# Patient Record
Sex: MALE | Race: WHITE | NOT HISPANIC OR LATINO | Employment: OTHER | URBAN - METROPOLITAN AREA
[De-identification: names, ages, dates, MRNs, and addresses within clinical notes are randomized per-mention and may not be internally consistent; named-entity substitution may affect disease eponyms.]

---

## 2022-07-12 PROBLEM — Z79.4 TYPE 2 DIABETES MELLITUS WITH DIABETIC POLYNEUROPATHY, WITH LONG-TERM CURRENT USE OF INSULIN (HCC): Status: ACTIVE | Noted: 2022-07-12

## 2022-07-12 PROBLEM — E11.42 TYPE 2 DIABETES MELLITUS WITH DIABETIC POLYNEUROPATHY, WITH LONG-TERM CURRENT USE OF INSULIN (HCC): Status: ACTIVE | Noted: 2022-07-12

## 2022-07-12 PROBLEM — K76.0 NAFLD (NONALCOHOLIC FATTY LIVER DISEASE): Status: ACTIVE | Noted: 2022-07-12

## 2022-07-12 PROBLEM — M54.16 LUMBAR BACK PAIN WITH RADICULOPATHY AFFECTING LEFT LOWER EXTREMITY: Status: ACTIVE | Noted: 2022-07-12

## 2022-07-12 PROBLEM — L20.9 ATOPIC DERMATITIS: Status: ACTIVE | Noted: 2022-07-12

## 2022-07-13 PROBLEM — Z91.51 HISTORY OF SUICIDE ATTEMPT: Status: ACTIVE | Noted: 2019-06-10

## 2022-07-13 PROBLEM — M54.59 PAIN OF LUMBAR FACET JOINT: Status: ACTIVE | Noted: 2019-03-12

## 2022-07-13 PROBLEM — N40.1 LOWER URINARY TRACT SYMPTOMS DUE TO BENIGN PROSTATIC HYPERPLASIA: Status: ACTIVE | Noted: 2019-10-06

## 2022-07-13 PROBLEM — H47.011 ISCHEMIC OPTIC NEUROPATHY, RIGHT EYE: Status: ACTIVE | Noted: 2019-03-08

## 2022-07-13 PROBLEM — Z86.69 PERSONAL HISTORY OF OTHER DISEASES OF THE NERVOUS SYSTEM AND SENSE ORGANS: Status: ACTIVE | Noted: 2021-03-12

## 2022-07-13 PROBLEM — M46.80: Status: ACTIVE | Noted: 2021-07-22

## 2022-07-13 PROBLEM — E11.9 TYPE 2 DIABETES MELLITUS WITHOUT RETINOPATHY (HCC): Status: ACTIVE | Noted: 2019-01-24

## 2022-07-13 PROBLEM — H26.9 INCIPIENT CATARACT OF BOTH EYES: Status: ACTIVE | Noted: 2019-03-08

## 2022-11-09 PROBLEM — M79.671 RIGHT FOOT PAIN: Status: ACTIVE | Noted: 2022-11-09

## 2023-04-05 ENCOUNTER — TELEPHONE (OUTPATIENT)
Dept: NEUROLOGY | Facility: MEDICAL CENTER | Age: 68
End: 2023-04-05

## 2023-04-05 NOTE — TELEPHONE ENCOUNTER
Phone Number Called: [phone]    Call outcome: Left detailed message for patient. Informed to call back with any additional questions.    Message: Medical Assistant Lorrie for Dr. Cadet asked me to reach out to patient to schedule new patient appointment for MS.  LVM for patient to call us and provided the number to get him scheduled with Dr. Cadet as new patient for MS. Advise in the message that our first available is in June going into July timeframe. Please scheduled patient as new patient with Dr. Cadet as new patient next available.     ESTEFANY Cruz

## 2023-07-17 DIAGNOSIS — G37.9 DEMYELINATING DISEASE (HCC): Primary | ICD-10-CM

## 2023-07-18 ENCOUNTER — OFFICE VISIT (OUTPATIENT)
Dept: NEUROLOGY | Facility: MEDICAL CENTER | Age: 68
End: 2023-07-18
Attending: PSYCHIATRY & NEUROLOGY
Payer: MEDICARE

## 2023-07-18 ENCOUNTER — HOSPITAL ENCOUNTER (OUTPATIENT)
Dept: RADIOLOGY | Facility: MEDICAL CENTER | Age: 68
End: 2023-07-18

## 2023-07-18 VITALS
SYSTOLIC BLOOD PRESSURE: 114 MMHG | HEART RATE: 89 BPM | OXYGEN SATURATION: 99 % | DIASTOLIC BLOOD PRESSURE: 76 MMHG | BODY MASS INDEX: 25.3 KG/M2 | WEIGHT: 157.41 LBS | TEMPERATURE: 98.3 F | HEIGHT: 66 IN

## 2023-07-18 DIAGNOSIS — R41.9 COGNITIVE COMPLAINTS: ICD-10-CM

## 2023-07-18 DIAGNOSIS — G47.33 OSA (OBSTRUCTIVE SLEEP APNEA): Primary | ICD-10-CM

## 2023-07-18 DIAGNOSIS — G37.9 DEMYELINATING DISEASE (HCC): ICD-10-CM

## 2023-07-18 PROCEDURE — 99212 OFFICE O/P EST SF 10 MIN: CPT | Performed by: PSYCHIATRY & NEUROLOGY

## 2023-07-18 PROCEDURE — 3078F DIAST BP <80 MM HG: CPT | Performed by: PSYCHIATRY & NEUROLOGY

## 2023-07-18 PROCEDURE — 3074F SYST BP LT 130 MM HG: CPT | Performed by: PSYCHIATRY & NEUROLOGY

## 2023-07-18 PROCEDURE — 99204 OFFICE O/P NEW MOD 45 MIN: CPT | Performed by: PSYCHIATRY & NEUROLOGY

## 2023-07-18 RX ORDER — DULOXETINE 40 MG/1
40 CAPSULE, DELAYED RELEASE ORAL DAILY
COMMUNITY
Start: 2023-07-10 | End: 2023-10-08

## 2023-07-18 RX ORDER — GLUCAGON INJECTION, SOLUTION 1 MG/.2ML
INJECTION, SOLUTION SUBCUTANEOUS
COMMUNITY
Start: 2023-05-04

## 2023-07-18 RX ORDER — BUPROPION HYDROCHLORIDE 200 MG/1
TABLET, EXTENDED RELEASE ORAL
COMMUNITY
Start: 2023-07-10 | End: 2023-10-12

## 2023-07-18 ASSESSMENT — MONTREAL COGNITIVE ASSESSMENT (MOCA)
WHAT IS THE VERSION OF MOCA ADMINISTERED: 7.1
2. COPY DRAWING: 1/1
CATEGORY CUE (IF APPLICABLE): 1
7. [VIGILENCE] TAP WHEN HEARING DESIGNATED LETTER: 1/1
8. SERIAL SUBTRACTION OF 7S: 2 OR 3/5
3. DRAW A CLOCK: CONTOUR, NUMBERS, HANDS: 3/3
1. ALTERNATING TRAIL MAKING: 1/1
10. [FLUENCY] NAME WORDS STARTING WITH DESIGNATED LETTER: 1/1
6. READ LIST OF DIGITS [FORWARD/BACKWARD]: 2/2
WHAT IS THE TOTAL SCORE (OUT OF 30): 25
CATEGORY CUE (IF APPLICABLE): 3
11. FOR EACH PAIR OF WORDS, WHAT CATEGORY DO THEY BELONG TO (OUT OF 2): 2/2
ADD 1 POINT IF LESS THAN OR EQUAL TO 12 YR EDUCATION LEVEL: 0
5. MEMORY TRIALS: FIRST TRIAL;SECOND TRIAL
4. NAME EACH OF THE THREE ANIMALS SHOWN: 3/3
ORIENTATION SUBSCORE: 6/6
9. REPEAT EACH SENTENCE: 2/2
DELAYED RECALL SUBSCORE: 1/5

## 2023-07-18 ASSESSMENT — FIBROSIS 4 INDEX: FIB4 SCORE: 1.2

## 2023-07-18 NOTE — PROGRESS NOTES
"[facility] NEUROLOGY  MULTIPLE SCLEROSIS & NEUROIMMUNOLOGY  NEW PATIENT VISIT    Referral source: Michael Grier MD    DISEASE SUMMARY:  Principal neurologic diagnosis: abnormal MRI, possible optic neuritis  Diagnosis of MS: n/a  Disease History:  - 2017: ION OD  - 2021: onset of cognitive symptoms (language, memory)  - 4/2023: worsened visual acuity OS shortly after cataract surgery, treated with IVMP x5 days without improvement  Disease course at onset: possible optic neuritis  Current disease course: possible optic neuritis  Previous disease therapies:  - none  Current disease therapies:  - none  Symptomatic therapies:  -   CSF (4/5/2023):  - OCBs: negative  - protein: 56 (elevated)  Other Testing:  - anti-MOG Ab (4/4/0223, CBA): <1:10  MRI orbits:  - 4/3/2023: \"questionable restricted diffusion and subtle contrast enhancement within the posterior wall of the ocular globe in the expected location of the optic disc... nonspecific but raises a possibility of anterior ischemic optic neuropathy...\"  MRI head:  - 4/4/2023: \"altered white matter signal affecting the callosal septal interface and gonzalez-ventricular white matter particularly in the parietal white matter...\"  MRI cervical spine:  - 2/7/2023: \"no hyperintense T2 signal alteration and or contrast enhancement demonstrated within the substance of the spinal cord to suggest a demyelinating process.\"  MRI thoracic spine:  - 4/4/2023: \"normal... no evidence of acute or chronic demyelinating plaques...\"  Immunizations:  - influenza?:   - Pneumonia?:  - SARS-CoV-2?:   Cancer Screens:  - mammogram:   - PAP?:   - skin check:     CC: \"demyelinating disease...\"    HISTORY OF ILLNESS:  Abundio Logan is a 68 y.o. man with a history most notable for HTN, HLD, T2DM, ischemic optic neuropathy (ION, right), and MDD, GUSTAVO.  Today, he was accompanied by his wife, and together they provided the following history:    2017:  Fabian developed ION in the right eye.  Visual symptoms arose over " "the course of ~1 hour.  He perceived a central scotoma.  A short time later the majority of his visual field was involved.  The following morning he visited his ophthalmologist and was diagnosed with ION.    The visual symptoms in the right eye persisted.    2021:  Fabian developed \"significant\" cognitive symptoms.  Sometimes he has difficulty holding a conversation.  He has such profound word-finding difficulty that he sometimes avoids interacting with other people.  His memory is quite poor.  He forgets what he has had for dinner.    2022:  Fabian and his wife relocated from [place].    4/2023:  Fabian was diagnosed with cataract in the left eye.  He underwent the procedure.  Immediately afterward the acuity in the left eye was \"20/20.\"  Two days later the vision in the left eye became much worse.  He returned to his ophthalmologist.  Later, he was referred to the hospital.  He was told that the optic nerve was \"swollen\" and showing signs of deterioration.  He was told he had \"optic neuritis.\"    Fabian consulted with Dr. Grier.  Workup included MRI and LP.  He wasn't given an explanation for his condition.  He was treated with IVMP x5  days.  This was ineffective.    Since that time the vision in the left eye improved but hasn't completley returned to normal.  He has difficulty driving in hazy or nighttime conditions.  Recently, he has noticed desaturation of colors, though he hasn't localized this to a specific eye.    MEDICAL AND SURGICAL HISTORY:  Past Medical History:   Diagnosis Date    Anxiety     Arthritis     Depression     Diabetes (HCC)     Frequent urination at night     GERD (gastroesophageal reflux disease)     History of chickenpox     History of stroke     Hypertension     Sleep apnea     Suicidal ideations      Past Surgical History:   Procedure Laterality Date    CHOLECYSTECTOMY      LIVER BIOPSY      VASECTOMY       MEDICATIONS:  Current Outpatient Medications   Medication Sig    buPROPion (WELLBUTRIN " SR) 200 MG SR tablet     DULoxetine HCl 40 MG Cap DR Particles Take 40 mg by mouth every day.    GVOKE HYPOPEN 2-PACK 1 MG/0.2ML Solution Auto-injector inject 1 PEN NEEDLE subcutaneously if needed (FOR SEVERE HYPOGLYC...  (REFER TO PRESCRIPTION NOTES).    divalproex ER (DEPAKOTE ER) 500 MG TABLET SR 24 HR Take 500 mg by mouth every evening.    insulin glargine (LANTUS SOLOSTAR) 100 UNIT/ML Solution Pen-injector injection Inject 27 Units under the skin every evening.    betamethasone dipropionate 0.05 % Cream Apply thin layer topically to affected area twice daily.    bisoprolol-hydrochlorothiazide (ZIAC) 2.5-6.25 MG per tablet TAKE 1 TABLET DAILY    tamsulosin (FLOMAX) 0.4 MG capsule TAKE 1 CAPSULE DAILY    glucose blood (ONETOUCH VERIO) strip Check your blood sugar 2 to 3 times a day (Every morning before breakfast and every evening before dinner and sometimes before bed)    Insulin Pen Needle 32 G x 4 mm Use one insulin needle every day with insulin injection.    Multiple Vitamins-Minerals (MULTIVITAMIN ADULTS PO) Take  by mouth.    Ascorbic Acid (VITAMIN C) 1000 MG Tab Take  by mouth.    omeprazole (PRILOSEC) 20 MG delayed-release capsule Take 1 Capsule by mouth every day.    atorvastatin (LIPITOR) 40 MG Tab Take 1 Tablet by mouth every day.    OZEMPIC, 0.25 OR 0.5 MG/DOSE, 2 MG/3ML Solution Pen-injector Inject 0.25 mg under the skin every 7 days.     SOCIAL HISTORY:  Social History     Tobacco Use    Smoking status: Never    Smokeless tobacco: Never   Substance Use Topics    Alcohol use: Yes     Social History     Social History Narrative    Not on file     FAMILY HISTORY:  Family History   Problem Relation Age of Onset    Cancer Mother         breast    Cancer Father         esophageal and stomach     REVIEW OF SYSTEMS:  A ROS was completed.  Pertinent positives and negatives were included in the HPI, above.  All other systems were reviewed and are negative.    PHYSICAL EXAM:  General/Medical:  - NAD  - hair,  skin, nails, and joints were normal    Neuro:  MENTAL STATUS: awake and alert; no deficits of speech or language; oriented to person, place, and time; affect was appropriate to situation; pleasant, cooperative    CRANIAL NERVES:    II: acuity: J16/J1-1, fields: right eye with inferio-medial deficit, left eye with inferio-medial deficit, pupils: 3/3 to 2.5/2.5 without a relative afferent pupillary defect, discs: sharp, no red desaturation noted    III/IV/VI: versions: intact without nystagmus    V: facial sensation: symmetric to light touch    VII: facial expression: symmetric    VIII: hearing: intact to finger rub    IX/X: palate: elevates symmetrically    XI: shoulder shrug: symmetric    XII: tongue: midline    MOTOR:  - bulk: normal throughout  - tone: normal in the upper extremities  Upper Extremity Strength  (R/L)    5/5   Elbow flexion 5/5   Elbow extension 5/5   Shoulder abduction 5/5     Lower Extremity Strength  (R/L)   Hip flexion 5/2-4 (pain)   Knee extension 5/5   Knee flexion 5/5   Ankle plantarflexion 5/5   Ankle dorsiflexion 5/5     - heel-walk: NT  - toe-walk: NT  - pronator drift: absent  - abnormal movements: none    SENSATION:  - light touch: grossly intact over the upper- and lower extremities  - vibration (R/L, seconds): 3/6 at the great toes  - pinprick:NT  - proprioception: NT  - Romberg: absent    COORDINATION:  - finger to nose: normal, no ataxia on exam  - finger tapping: rapid and accurate, bilaterally    REFLEXES:  Reflex Right Left   BR NT NT   Biceps NT NT   Triceps NT NT   Patellae NT NT   Achilles NT NT   Toes NT NT     GAIT:  - narrow base and normal  - heel-walk: NT  - toe-walk: NT  - tandem gait: intact    QUANTITATIVE SCORES:  Timed 25-foot walk (sec): NT.  Assistive device: none    [place] Cognitive Assessment (MOCA) Version Number: 7.1   VISUOSPACIAL / EXECUTIVE   Clock Drawing: 3/3  Spatial Drawin/1  Cube Drawin/1    NAMING  Naming: 3/3    MEMORY  Memory: First  "trial;Second trial    ATTENTION  Digits: 2/2  Letters: 1/1  Subtraction: 2 or 3/5    LANGUAGE  Repeat Phrases: 2/2  Fluency: 1/1    ABSTRACTION  Abstraction: 2/2    DELAYED RECALL  Recall words: 1/5  Category Cue (if applicable): 3  Multiple Choice Cue (if applicable):1    ORIENTATION  Orientation: 6/6    Add 1 point if less than or equal to 12 yr education level: 0   MOCA TOTAL SCORE:  25 /30  Biomechanical/Visual Limitations (if applicable):       REVIEW OF IMAGING STUDIES:  I have summarized pertinent data above.    REVIEW OF LABORATORY STUDIES:  4/9/2023:  - CBC w/ diff: within acceptable limits  - CMP: notabl for glucose: 213    8/18/2022:  - TSH: 1.98    ASSESSMENT:  Abundio Logan is a 68 y.o. man with a mild cognitive impairment, possible optic neuritis, as well as HTN, HLD, T2DM, ischemic optic neuropathy (ION, right), and MDD, GUSTAVO.  Fabian's clinical history is not entirely convincing for multiple sclerosis.  We have requested the actual images from [facility], and I will review these once they become available.  I have also ordered the CNS demyelinating disease panel which includes cell-based tests for the anti-AQP4 and anti-MOG Abs.  HE meets criteria for mild cognitive impairment based on his performance on the MOCA.  I have ordered a vitamin B12 level in order to complete the recommended screening for reversible causes of dementia.  His sleep is disordered, so I referred him to Sleep Medicine.      PLAN:  Possible Optic Neuritis:  - CNS demyelinating disease profile  - will review outside images after they become available    Mild Cognitive Impairment:  - vitamin b12 level    Follow-Up:  - Return in about 5 months (around 12/18/2023).    Signed: Mo Cadet M.D.    BILLING DOCUMENTATION:   I spent 55 minutes reviewing the medical record, interviewing and examining the patient, discussing my impression (see \"assessment\" above), and coordinating care.    "

## 2023-11-15 ENCOUNTER — OFFICE VISIT (OUTPATIENT)
Dept: NEUROLOGY | Facility: MEDICAL CENTER | Age: 68
End: 2023-11-15
Attending: PSYCHIATRY & NEUROLOGY
Payer: MEDICARE

## 2023-11-15 VITALS
WEIGHT: 156.97 LBS | HEIGHT: 66 IN | RESPIRATION RATE: 15 BRPM | BODY MASS INDEX: 25.23 KG/M2 | OXYGEN SATURATION: 100 % | DIASTOLIC BLOOD PRESSURE: 70 MMHG | SYSTOLIC BLOOD PRESSURE: 118 MMHG | HEART RATE: 79 BPM | TEMPERATURE: 97.6 F

## 2023-11-15 DIAGNOSIS — F03.A0 MILD DEMENTIA WITHOUT BEHAVIORAL DISTURBANCE, PSYCHOTIC DISTURBANCE, MOOD DISTURBANCE, OR ANXIETY, UNSPECIFIED DEMENTIA TYPE (HCC): ICD-10-CM

## 2023-11-15 DIAGNOSIS — G25.0 ESSENTIAL TREMOR: ICD-10-CM

## 2023-11-15 DIAGNOSIS — H47.012 ISCHEMIC OPTIC NEUROPATHY OF LEFT EYE: ICD-10-CM

## 2023-11-15 PROCEDURE — 99215 OFFICE O/P EST HI 40 MIN: CPT | Performed by: PSYCHIATRY & NEUROLOGY

## 2023-11-15 PROCEDURE — 99212 OFFICE O/P EST SF 10 MIN: CPT | Performed by: PSYCHIATRY & NEUROLOGY

## 2023-11-15 PROCEDURE — 3078F DIAST BP <80 MM HG: CPT | Performed by: PSYCHIATRY & NEUROLOGY

## 2023-11-15 PROCEDURE — 3074F SYST BP LT 130 MM HG: CPT | Performed by: PSYCHIATRY & NEUROLOGY

## 2023-11-15 PROCEDURE — G2212 PROLONG OUTPT/OFFICE VIS: HCPCS | Performed by: PSYCHIATRY & NEUROLOGY

## 2023-11-15 RX ORDER — DIVALPROEX SODIUM 500 MG/1
500 TABLET, EXTENDED RELEASE ORAL DAILY
COMMUNITY

## 2023-11-15 ASSESSMENT — MONTREAL COGNITIVE ASSESSMENT (MOCA)
4. NAME EACH OF THE THREE ANIMALS SHOWN: 3/3
3. DRAW A CLOCK: CONTOUR, NUMBERS, HANDS: 3/3
CATEGORY CUE (IF APPLICABLE): 2
ORIENTATION SUBSCORE: 3/6
WHAT IS THE VERSION OF MOCA ADMINISTERED: 7.1
8. SERIAL SUBTRACTION OF 7S: 4 OR 5/5
7. [VIGILENCE] TAP WHEN HEARING DESIGNATED LETTER: 1/1
11. FOR EACH PAIR OF WORDS, WHAT CATEGORY DO THEY BELONG TO (OUT OF 2): 2/2
DELAYED RECALL SUBSCORE: 2/5
WHAT IS THE TOTAL SCORE (OUT OF 30): 24
2. COPY DRAWING: 1/1
10. [FLUENCY] NAME WORDS STARTING WITH DESIGNATED LETTER: 1/1
ADD 1 POINT IF LESS THAN OR EQUAL TO 12 YR EDUCATION LEVEL: 0
9. REPEAT EACH SENTENCE: 2/2
5. MEMORY TRIALS: SECOND TRIAL;FIRST TRIAL
1. ALTERNATING TRAIL MAKING: 1/1
6. READ LIST OF DIGITS [FORWARD/BACKWARD]: 2/2

## 2023-11-15 ASSESSMENT — FIBROSIS 4 INDEX: FIB4 SCORE: 2.26

## 2023-11-15 NOTE — PROGRESS NOTES
"[facility] NEUROLOGY  MULTIPLE SCLEROSIS & NEUROIMMUNOLOGY  FOLLOW-UP VISIT    DISEASE SUMMARY:  Principal neurologic diagnosis: abnormal MRI, possible optic neuritis  Diagnosis of MS: n/a  Disease History:  - 2017: ION OD  - 2021: onset of cognitive symptoms (language, memory)  - 4/2023: worsened visual acuity OS shortly after cataract surgery, treated with IVMP x5 days without improvement  Disease course at onset: possible optic neuritis  Current disease course: possible optic neuritis  Previous disease therapies:  - none  Current disease therapies:  - none  Symptomatic therapies:  -   CSF (4/5/2023):  - OCBs: negative  - protein: 56 (elevated)  Other Testing:  - anti-MOG Ab (4/4/0223, CBA): <1:10  - CNS demyelinating disease reflex panel (7/18/2023): negative (CBA)  MRI orbits:  - 4/3/2023: \"questionable restricted diffusion and subtle contrast enhancement within the posterior wall of the ocular globe in the expected location of the optic disc... nonspecific but raises a possibility of anterior ischemic optic neuropathy...\"  MRI head:  - 4/4/2023: \"altered white matter signal affecting the callosal septal interface and gonzalez-ventricular white matter particularly in the parietal white matter...\"  MRI cervical spine:  - 2/7/2023: \"no hyperintense T2 signal alteration and or contrast enhancement demonstrated within the substance of the spinal cord to suggest a demyelinating process.\"  MRI thoracic spine:  - 4/4/2023: \"normal... no evidence of acute or chronic demyelinating plaques...\"  Immunizations:  - influenza?:   - Pneumonia?:  - SARS-CoV-2?:   Cancer Screens:  - mammogram:   - PAP?:   - skin check:     CC: possible optic neuritis, mild cognitive impairment    INTERVAL HISTORY:  Abundio Logan is a 68 y.o. man with a mild cognitive impairment, possible optic neuritis, as well as HTN, HLD, T2DM, ischemic optic neuropathy (ION, right), and MDD, GUSTAVO.  I last saw him in the [facility] on 7/18/2023.  At that time I " recommended CNS demyelinating disease profile and vitamin B12 level.  Today, he was accompanied by his wife, and he provided the following interval history:    Fabian has not noticed any new neurologic symptoms since the last visit.    He continues to live at home with his wife.  Fabian drives without getting lost.  He manages money without making mistakes.  He does have difficulty filling his pillbox every week, and his wife provides assistance with this.  His sleep remains poor.  He is scheduled to consult with sleep medicine in a few months.    MEDICATIONS:  Current Outpatient Medications   Medication Sig    divalproex ER (DEPAKOTE ER) 500 MG TABLET SR 24 HR Take 500 mg by mouth every day.    atorvastatin (LIPITOR) 40 MG Tab Take 1 Tablet by mouth every day.    tamsulosin (FLOMAX) 0.4 MG capsule Take 1 Capsule by mouth every day.    divalproex (DEPAKOTE) 250 MG Tablet Delayed Response Take 1 Tablet by mouth at bedtime.    DULoxetine HCl 40 MG Cap DR Particles     propranolol (INDERAL) 10 MG Tab Take 10 mg by mouth 3 times a day as needed.    ondansetron (ZOFRAN) 4 MG Tab tablet Take 4 mg by mouth.    buPROPion (WELLBUTRIN SR) 200 MG SR tablet Take 1 Tablet by mouth every morning.    omeprazole (PRILOSEC) 20 MG delayed-release capsule Take 1 Capsule by mouth every day.    GVOKE HYPOPEN 2-PACK 1 MG/0.2ML Solution Auto-injector inject 1 PEN NEEDLE subcutaneously if needed (FOR SEVERE HYPOGLYC...  (REFER TO PRESCRIPTION NOTES).    insulin glargine (LANTUS SOLOSTAR) 100 UNIT/ML Solution Pen-injector injection Inject 27 Units under the skin every evening.    betamethasone dipropionate 0.05 % Cream Apply thin layer topically to affected area twice daily.    bisoprolol-hydrochlorothiazide (ZIAC) 2.5-6.25 MG per tablet TAKE 1 TABLET DAILY    glucose blood (ONETOUCH VERIO) strip Check your blood sugar 2 to 3 times a day (Every morning before breakfast and every evening before dinner and sometimes before bed)    Insulin Pen  Needle 32 G x 4 mm Use one insulin needle every day with insulin injection.    Multiple Vitamins-Minerals (MULTIVITAMIN ADULTS PO) Take  by mouth.     MEDICAL, SOCIAL, AND FAMILY HISTORY:  There is no change in the patient's ROS or medical, social, or family histories since the previous visit on 7/18/2023.    REVIEW OF SYSTEMS:  A ROS was completed.  Pertinent positives and negatives were included in the HPI, above.  All other systems were reviewed and are negative.    PHYSICAL EXAM:  General/Medical:  - NAD    Neuro:  MENTAL STATUS: awake and alert; no deficits of speech or language; oriented to conversation; affect was appropriate to situation; pleasant, cooperative    CRANIAL NERVES:    II: acuity: NT, fields: NT, pupils: NT, discs: NT    III/IV/VI: versions: grossly intact    V: facial sensation: NT    VII: facial expression: symmetric    VIII: hearing: intact to voice    IX/X: palate: NT    XI: shoulder shrug: NT    XII: tongue: NT    MOTOR:  - bulk: NT  - tone: NT  Upper Extremity Strength (R/L)    NT   Elbow flexion NT   Elbow extension NT   Shoulder abduction NT     Lower Extremity Strength (R/L)   Hip flexion NT   Knee extension NT   Knee flexion NT   Ankle plantarflexion NT   Ankle dorsiflexion NT     - pronator drift: NT  - abnormal movements: action tremor which is worse with attempts at [employer] [place]    SENSATION:  - light touch: NT  - vibration (R/L, seconds): NT at the great toes  - pinprick: NT  - proprioception: NT  - Romberg: NT    COORDINATION:  - finger to nose: NT  - finger tapping: NT    REFLEXES:  Reflex Right Left   BR NT NT   Biceps NT NT   Triceps NT NT   Patellae NT NT   Achilles NT NT   Toes NT NT     GAIT:  - NT    REVIEW OF IMAGING STUDIES:  Outside images reviewed.    REVIEW OF LABORATORY STUDIES:  7/18/2023:  - vitamin B12: 699    ASSESSMENT:  Abundio Logan is a 68 y.o. man with a history of ION (left), mild cognitive impairment, essential tremor as well as HTN, HLD, T2DM,  ischemic optic neuropathy (ION, right), and MDD, GUSTAVO.  Since last visit we obtained outside images.  We reviewed these together during the visit.  The distribution of lesions is not consistent with multiple sclerosis.  Given his age, medical history, and absence of CNS-specific alcohol bands, I have a very low suspicion for multiple sclerosis.  I will attribute the left monocular symptoms to ischemic optic neuropathy.  Since last visit Fabian and his wife have noticed progressively worse cognitive symptoms.  Since he has difficulty managing medications on his own, he probably meets criteria for a dementia.  At the last visit I reviewed laboratory studies in search of a reversible cause of cognitive impairment and ordered a B12 level.  All of his laboratories were normal.  I repeated a MoCA and his score was one-point lower than it was at the time of the last visit.  I discussed the implications of a possible diagnosis of dementia at length and reviewed a list of recommendations to promote good cognitive function.  I encouraged him to keep the sleep medicine appointment since disordered sleep will make his cognition worse.  We will follow-up in approximately 6 months to reassess his cognition.  Fabian has essential tremor, and this is being treated with propranolol.    PLAN:  History of ION:  - no additional workup at this time    Mild Cognitive Impairment vs Dementia:  1. Proper Diet: We recommend the Mediterranean diet   2. Proper Vascular Health: Making sure that your primary care provider (PCP) is screening for and treating all vascular risk factors (diabetes, high cholesterol, high blood pressure, and such)  3. Quit smoking, if you smoke   4. Exercise as tolerated with a goal of at least 30 minutes 5 days a week or a total of 150 minutes per week  5. Focus on what you enjoy  6. Try learning new hobbies or skills, even if you’re not great at them  7. Regular social interaction: Maintain an active social life as much as  "possible   8. Keep your brain active with cognitively stimulating activities such as brain games  9. Get 7-8 hours of sleep per night; keep appointment w/ Sleep Medicine  10. Maintain a predictable daily routine  [You may visit www.dementiafriendly[place].org AND www.healthybrains.org for more information]    Essential Tremor:  - continue propranolol    Follow-Up:  - Return in about 6 months (around 5/15/2024).    Signed: Mo Cadet M.D.    BILLING DOCUMENTATION:   I spent 55 minutes reviewing the medical record, interviewing and examining the patient, discussing my impression (see \"assessment\" above), and coordinating care.  "

## 2023-12-26 ASSESSMENT — ENCOUNTER SYMPTOMS: SLEEP DISTURBANCE: 0

## 2023-12-27 ENCOUNTER — OFFICE VISIT (OUTPATIENT)
Dept: SLEEP MEDICINE | Facility: MEDICAL CENTER | Age: 68
End: 2023-12-27
Attending: PSYCHIATRY & NEUROLOGY
Payer: MEDICARE

## 2023-12-27 VITALS
SYSTOLIC BLOOD PRESSURE: 116 MMHG | DIASTOLIC BLOOD PRESSURE: 80 MMHG | BODY MASS INDEX: 23.78 KG/M2 | HEART RATE: 70 BPM | RESPIRATION RATE: 16 BRPM | HEIGHT: 66 IN | WEIGHT: 148 LBS | OXYGEN SATURATION: 95 %

## 2023-12-27 DIAGNOSIS — G47.33 OSA (OBSTRUCTIVE SLEEP APNEA): ICD-10-CM

## 2023-12-27 DIAGNOSIS — F51.02 ADJUSTMENT INSOMNIA: ICD-10-CM

## 2023-12-27 DIAGNOSIS — Z79.4 TYPE 2 DIABETES MELLITUS WITH DIABETIC POLYNEUROPATHY, WITH LONG-TERM CURRENT USE OF INSULIN (HCC): ICD-10-CM

## 2023-12-27 DIAGNOSIS — E11.42 TYPE 2 DIABETES MELLITUS WITH DIABETIC POLYNEUROPATHY, WITH LONG-TERM CURRENT USE OF INSULIN (HCC): ICD-10-CM

## 2023-12-27 DIAGNOSIS — I10 ESSENTIAL HYPERTENSION: ICD-10-CM

## 2023-12-27 PROCEDURE — 99212 OFFICE O/P EST SF 10 MIN: CPT | Performed by: PREVENTIVE MEDICINE

## 2023-12-27 PROCEDURE — 3074F SYST BP LT 130 MM HG: CPT | Performed by: PREVENTIVE MEDICINE

## 2023-12-27 PROCEDURE — 99204 OFFICE O/P NEW MOD 45 MIN: CPT | Performed by: PREVENTIVE MEDICINE

## 2023-12-27 PROCEDURE — 3079F DIAST BP 80-89 MM HG: CPT | Performed by: PREVENTIVE MEDICINE

## 2023-12-27 RX ORDER — TEMAZEPAM 7.5 MG/1
7.5 CAPSULE ORAL
Qty: 2 CAPSULE | Refills: 0 | Status: SHIPPED | OUTPATIENT
Start: 2023-12-27 | End: 2024-01-31

## 2023-12-27 ASSESSMENT — FIBROSIS 4 INDEX: FIB4 SCORE: 2.26

## 2023-12-27 NOTE — PROGRESS NOTES
"CHIEF COMPLIANT: \"Establish care.\"  COLLATERAL:  WIFE COLT  HISTORY OF PRESENT ILLNESS:  Abundio Logan is a 68 y.o. male kindly referred by REBECA Vanegas and  is here for a sleep medicine consultation.     Sleep History:  Abundio Logan has been tested for sleep apnea.by the [facility] using a split night PSG on 5/15/2015.  Patient was advised to begin using PAP therapy but did not tolerate it for long.  Currently the patient reports snoring , apneas and fragmented sleep and wakes up with a dry mouth.  He also has a history of sxs of GERD at night.   He reports memory loss as well. The patient goes to bed at 9-11 pm and wakes up at 7 am. It usually takes the patient approximately 5-10 mins to fall asleep. The patient does not  feel refreshed and does nap during the day.  ( 1 -1.5   hours ) The patient has never  used tobacco.   . Pt does use cannabis ( Indica smokes or use a gummy  5-6 X /week).  This pt does drink 2 alcoholic beverages per month and has 1-2 caffeinated beverages daily.     The patient denies any symptoms of narcolepsy such as sleep paralysis or cataplexy, or any symptoms that suggest parasomnias such as sleep walking or acting out of dreams.    Abundio Logan is a  retired  [employer] .     Endorses family members who use PAP therapy/snore: parents snored    EPWORTH SCORE:     12 /24, this is  consistent with EDS      TYPE: split night PSG  DATE: 5/15/2015  Diagnostic:AHI:  54.5  Diagnostic  Oxygen Lucien: 82%   TREATMENT AHI:  27.3  TREATMENT Oxygen Lucien: 79%   TITRATION:  CPAP and BILEVEL  PLMS index: 20.2      Significant comorbidities and modifying factors: see below    PAST MEDICAL HISTORY:  Past Medical History:   Diagnosis Date    Abdominal pain     Anxiety     Apnea, sleep     Arthritis     Back pain     Back pain     Chickenpox     Constipation     Daytime sleepiness     Depression     Diabetes (HCC)     Diarrhea     Difficulty " swallowing     Dizziness     Earache     Eye drainage     Fatigue     Frequent urination     Frequent urination at night     GERD (gastroesophageal reflux disease)     Hearing difficulty     History of chickenpox     History of stroke     Hyperlipidemia     Hypertension     Influenza     Insomnia     Kidney stone     Mumps     Nasal drainage     Nausea     Painful joint     Ringing in ears     Sleep apnea     Snoring     Suicidal ideations     Sweat, sweating, excessive     Tonsillitis     Vision loss     Weakness     Wears glasses       PROBLEM LIST:  Patient Active Problem List    Diagnosis Date Noted    Ischemic optic neuropathy of left eye 11/15/2023    Mild dementia without behavioral disturbance, psychotic disturbance, mood disturbance, or anxiety ([facility]) 11/15/2023    Essential tremor 11/15/2023    Right foot pain 11/09/2022    NAFLD (nonalcoholic fatty liver disease) 07/12/2022    Type 2 diabetes mellitus with diabetic polyneuropathy, with long-term current use of insulin ([facility]) 07/12/2022    Atopic dermatitis 07/12/2022    Lumbar back pain with radiculopathy affecting left lower extremity 07/12/2022    Other specified inflammatory spondylopathies, site unspecified ([facility]) 07/22/2021    Personal history of other diseases of the nervous system and sense organs 03/12/2021    Lower urinary tract symptoms due to benign prostatic hyperplasia 10/06/2019    History of suicide attempt 06/10/2019    Pain of lumbar facet joint 03/12/2019    Incipient cataract of both eyes 03/08/2019    Ischemic optic neuropathy, right eye 03/08/2019    Type 2 diabetes mellitus without retinopathy ([facility]) 01/24/2019    Cerebrovascular accident (CVA) ([facility]) 08/05/2016    Hypersomnia due to medical condition 08/05/2016    Obstructive sleep apnea syndrome 08/05/2016    Dyslipidemia 05/31/2013    Memory loss 10/28/2012    High aspartate transaminase measurement 01/30/2010    Disorder of intervertebral disc 01/25/2010    GERD (gastroesophageal  reflux disease) 08/19/2009    Generalized anxiety disorder 08/04/2009    HTN (hypertension) 08/04/2009    Major depressive disorder, recurrent episode, mild (HCC) 08/04/2009    Osteoarthrosis 08/04/2009    Rosacea 08/04/2009     PAST SOCIAL HISTORY:  Past Surgical History:   Procedure Laterality Date    CHOLECYSTECTOMY      LIVER BIOPSY      ME REMV 2ND CATARACT,CORN-SCLER SECTN      VASECTOMY       PAST FAMILY HISTORY:  Family History   Problem Relation Age of Onset    Cancer Mother         breast    Breast Cancer Mother     Cancer Father         esophageal and stomach    Stroke Father     Stroke Paternal Grandfather      SOCIAL HISTORY:  Social History     Socioeconomic History    Marital status:      Spouse name: Not on file    Number of children: Not on file    Years of education: Not on file    Highest education level: Associate degree: occupational, technical, or vocational program   Occupational History    Not on file   Tobacco Use    Smoking status: Some Days    Smokeless tobacco: Never   Vaping Use    Vaping Use: Never used   Substance and Sexual Activity    Alcohol use: Yes     Alcohol/week: 0.6 oz     Types: 1 Standard drinks or equivalent per week     Comment: Typically when eating meals out    Drug use: Yes     Frequency: 4.0 times per week     Types: Marijuana     Comment: In evening to help with sleep    Sexual activity: Not on file   Other Topics Concern    Not on file   Social History Narrative    Not on file     Social Determinants of Health     Financial Resource Strain: Low Risk  (11/7/2022)    Overall Financial Resource Strain (CARDIA)     Difficulty of Paying Living Expenses: Not hard at all   Food Insecurity: No Food Insecurity (11/7/2022)    Hunger Vital Sign     Worried About Running Out of Food in the Last Year: Never true     Ran Out of Food in the Last Year: Never true   Transportation Needs: No Transportation Needs (11/7/2022)    PRAPARE - Transportation     Lack of Transportation  (Medical): No     Lack of Transportation (Non-Medical): No   Physical Activity: Sufficiently Active (11/7/2022)    Exercise Vital Sign     Days of Exercise per Week: 3 days     Minutes of Exercise per Session: 60 min   Stress: Unknown (11/7/2022)    [employer] - Occupational Stress Questionnaire     Feeling of Stress : Patient refused   Social Connections: Unknown (11/7/2022)    Social Connection and Isolation Panel [NHANES]     Frequency of Communication with Friends and Family: Twice a week     Frequency of Social Gatherings with Friends and Family: More than three times a week     Attends Church Services: Patient refused     Active Member of Clubs or Organizations: No     Attends Club or Organization Meetings: Never     Marital Status:    Intimate Partner Violence: Not on file   Housing Stability: Low Risk  (11/7/2022)    Housing Stability Vital Sign     Unable to Pay for Housing in the Last Year: No     Number of Places Lived in the Last Year: 2     Unstable Housing in the Last Year: No     ALLERGIES: Azithromycin, Tetracycline, Erythromycin, Lisinopril, and Metformin  MEDICATIONS:  Current Outpatient Medications   Medication Sig Dispense Refill    temazepam (RESTORIL) 7.5 MG capsule Take 1 Capsule by mouth at bedtime as needed.  May repeat 1 time. for Sleep (may take 1 capssule  at bedtime and repeat X 1 for Sleep study) for up to 2 doses. Two caps make up  a one day dose.  Indications: Trouble Sleeping 2 Capsule 0    divalproex ER (DEPAKOTE ER) 500 MG TABLET SR 24 HR Take 500 mg by mouth every day.      atorvastatin (LIPITOR) 40 MG Tab Take 1 Tablet by mouth every day. 90 Tablet 1    tamsulosin (FLOMAX) 0.4 MG capsule Take 1 Capsule by mouth every day. 60 Capsule 0    DULoxetine HCl 40 MG Cap DR Particles       propranolol (INDERAL) 10 MG Tab Take 10 mg by mouth 3 times a day as needed.      ondansetron (ZOFRAN) 4 MG Tab tablet Take 4 mg by mouth.      buPROPion  "(WELLBUTRIN SR) 200 MG SR tablet Take 1 Tablet by mouth every morning.      omeprazole (PRILOSEC) 20 MG delayed-release capsule Take 1 Capsule by mouth every day. 60 Capsule 1    GVOKE HYPOPEN 2-PACK 1 MG/0.2ML Solution Auto-injector inject 1 PEN NEEDLE subcutaneously if needed (FOR SEVERE HYPOGLYC...  (REFER TO PRESCRIPTION NOTES).      insulin glargine (LANTUS SOLOSTAR) 100 UNIT/ML Solution Pen-injector injection Inject 27 Units under the skin every evening.      betamethasone dipropionate 0.05 % Cream Apply thin layer topically to affected area twice daily. 45 g 1    bisoprolol-hydrochlorothiazide (ZIAC) 2.5-6.25 MG per tablet TAKE 1 TABLET DAILY 90 Tablet 1    glucose blood (ONETOUCH VERIO) strip Check your blood sugar 2 to 3 times a day (Every morning before breakfast and every evening before dinner and sometimes before bed) 200 Strip 1    Insulin Pen Needle 32 G x 4 mm Use one insulin needle every day with insulin injection. 200 Each 1    Multiple Vitamins-Minerals (MULTIVITAMIN ADULTS PO) Take  by mouth.      divalproex (DEPAKOTE) 250 MG Tablet Delayed Response Take 1 Tablet by mouth at bedtime. (Patient not taking: Reported on 12/27/2023)       No current facility-administered medications for this visit.    \"CURRENT RX\"    REVIEW OF SYSTEMS:  Constitutional: Denies weight loss, endorses chronic daytime fatigue --also see HPI    PHYSICAL EXAM/VITALS:  /80 (BP Location: Left arm, Patient Position: Sitting, BP Cuff Size: Adult)   Pulse 70   Resp 16   Ht 1.676 m (5' 6\")   Wt 67.1 kg (148 lb)   SpO2 95%   BMI 23.89 kg/m²   Neck circumference (inches): 16  Appearance: Well-nourished, well-developed,  looks stated age, no acute distress  Eyes:   EOMI  ENMT: Mallampati:4    Neck: Supple, trachea midline  Respiratory effort:  No intercostal retractions or use of accessory muscles  Lung auscultation:  No wheezes rhonchi rubs or rales  Cardiac: No murmurs, rubs, or gallops; regular rhythm, normal rate; no " edema  Musculoskeletal:  Grossly normal; gait and station normal  Neurologic:  oriented to person, time, place, and purpose; judgement intact  Psychiatric:  No depression, anxiety, agitation    MEDICAL DECISION MAKING:  The medical record was reviewed as it pertains to this referral. This includes records from primary care,consultants notes, referral request, hospital records, labs and imaging. Any available diagnostic and titration nocturnal polysomnograms, home sleep apnea tests, continuous nocturnal oximetry results, multiple sleep latency tests, and recent compliance reports were reviewed with the patient.    ASSESSMENT/PLAN:  Abundio Logan is a 68 y.o. male  who has been diagnosed obstructive sleep apnea as of 2015. On that study he was also observed to have Cheyne Fleming respiratory pattern which is consistent with central apneas.  Patient denies any awareness of leg movement during sleep.  Patient understands that he will need to be retested with a split night study in order to confirm his diagnoses at this time.          DIAGNOSES :    1. CHANTEL (obstructive sleep apnea)  - Polysomnography, 4 or More; Future    2. Essential hypertension  - Polysomnography, 4 or More; Future    3. Type 2 diabetes mellitus with diabetic polyneuropathy, with long-term current use of insulin (HCC)  - Polysomnography, 4 or More; Future    4. Adjustment insomnia  - temazepam (RESTORIL) 7.5 MG capsule; Take 1 Capsule by mouth at bedtime as needed.  May repeat 1 time. for Sleep (may take 1 capssule  at bedtime and repeat X 1 for Sleep study) for up to 2 doses. Two caps make up  a one day dose.  Indications: Trouble Sleeping  Dispense: 2 Capsule; Refill: 0    The patient has signs and symptoms consistent with obstructive sleep apnea hypopnea syndrome. Will schedule a nocturnal polysomnogram or a home sleep apnea test (please see plan).  The risks of untreated sleep apnea were discussed with the patient at length. Patients with CHANTEL are at  increased risk of cardiovascular disease including coronary artery disease, systemic arterial hypertension, pulmonary arterial hypertension, cardiac arrhythmias, and stroke. CHANTEL patients have an increased risk of motor vehicle accidents, type 2 diabetes, chronic kidney disease, and non-alcoholic liver disease. The patient was advised to avoid driving a motor vehicle when drowsy.  Have advised the patient to follow up with the appropriate healthcare practitioners for all other medical problems and issues.    RETURN TO CLINIC: Return for 3 weeks after SS - discuss results w/ any provider.    My total time spent caring for the patient on the day of the encounter was 40 minutes. This includes time spent on a thorough chart review including other physician notes, all sleep studies, as well as critical labs and pulmonary and cardiac studies.  Additionally, it includes a thorough discussion of good sleep hygiene and stimulus control, as well as  the need for consistency in terms of sleep preparation and practice.    Please note that this dictation was created using voice recognition software.  I have made every reasonable attempt to correct obvious errors, I expect that there are errors of grammar and possibly content that I did not discover before finalizing this note.                    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 If so, please list the year and result:: ?  Have you had Allergy skin testing? If so, please list the year and result:: Yes / approx 2015 fragrances  Please briefly describe your sleep problem and how old you were when it began.: Interrupted sleep patterns always  How does this affect your daily life and activities? Please also rate how serious of a problem this is (1 = Not at all, 10 = Very Serious).: Fatigue 7  Have you had any previous evaluations, examinations, or treatment for this sleep problem or any other problems with your sleep? If so, please describe the evaluation, treatment, and results.: Yes  received Bi-Pap  Have you used any medications (prescribed or otherwise) to help your sleep problem? If yes, include name, amount, frequency, and the prescribing physician.: Yes [place]  If employed, what time do you usually start and end work?: N/a  Do you ever change work shifts? If yes, describe how often (never, infrequently, regularly).: Yes and no  What time do you usually go to bed and wake up on: Weekdays? Weekends?: 9 - 9:30.  Up around 7:30  How many minutes does it usually take to fall asleep at night after turning off the lights?: 30 to 1.5 hr  What do you ordinarily do just prior to turning out the lights and attempting to go to sleep (e.g., reading, TV, baths, etc.)?: Smoke weed or gummy  On average, how many times do you wake up during the night?: 3 to 10  On average, how many times do you wake up to use the bathroom?: 2  Do you often wake up too early in the morning and are unable to return to sleep?: No  On average, how many hours of sleep do you get per night?: 6 to 8  How do you usually awaken?  Alarm, spontaneously, or other?: Spontaneous and occ  alarm  Is it difficult for you to awaken and get out of bed after sleeping? (Not at all, Sometimes, Very): Most always  Do you nap or return to bed after arising?: Yes  Are you bothered by sleepiness during the day?: Yes  Do you feel that you get too much sleep at night?: No  Do you feel that you get too little sleep at night?: To little sound sleep  Do you usually feel tired during the day? If so, what do you attribute this to?: Possibly current R/x’s but always had this  Do you find yourself falling asleep when you don't mean to? : Yes  If yes, how long does your sleep episode last?: Sometimes seconds or longer  Do you feel rested or refreshed after the sleep episode?: No  Have you ever suddenly fallen?: Do have some balance problems  Have you ever experienced sudden body weakness?: No  If yes, were you aware of the things around you?: No  Have you ever experienced weakness or paralysis upon going to sleep?: No  Have you ever experienced weakness or paralysis upon awakening from sleep?: No  If yes for either of the above two questions, please indicate how many times per week does this occur?: N/a  Have you ever experienced seeing things or hearing voices/noises: That weren't real? On going to sleep? During the night? On awakening from sleep? During the day?: Don’t think so  Do you have difficulty breathing at night? If yes, briefly describe.: No  How many times per week?: No  How did you become aware of this, at what age did this first occur, and how many years has this occurred?: N/a  Have you been told you snore while asleep? If so, does it disturb a bed partner (or someone in the same room), or someone in the next room?: Yes duration between breaths scares wife  Have you ever experienced doing something without being aware of the action? If yes, please describe.: No  How many times per week does this occur?: N/a  Have you ever experienced upon lying in bed before sleep or on awakening from sleep: Restlessness  "of legs, \"nervous legs\", \"creeping crawling\" sensation of legs, or twitching of legs?: Yes extremely rare  How many times per week does this occur, and how many minutes does the sensation last?: N/a  Does anything relieve the sensations (e.g., getting out of bed, medication, massage)?: No  At what age did this first occur, and how many years has this occurred?: ?  Have you ever been told that your arms or legs jerk or twitch while you are asleep? If yes, how many times per night does this occur?: Last sleep study- ??  At what age did this first occur, and how many years has this occurred?: ?  Does this seem to awaken you from your sleep?: No  Do you know, or have you ever been told that you do any of the following while sleeping: talk, walk, grit teeth, wet the bed, wake up screaming or seemingly afraid, have disturbing dreams, have unusual movements, wake up with headaches, (males) have erections? If yes to any of these, please indicate how many times per week, age started, last occurrence, treatment received.: Yes  Has anyone in your family been known to have any sleep problems? If yes, please list the type of problem (e.g., trouble getting to sleep, too sleepy, bed wetting, etc.), the relationship of this person to you, and the treatment received.: Unknown    "

## 2024-01-16 ENCOUNTER — SLEEP STUDY (OUTPATIENT)
Dept: SLEEP MEDICINE | Facility: MEDICAL CENTER | Age: 69
End: 2024-01-16
Attending: PREVENTIVE MEDICINE
Payer: MEDICARE

## 2024-01-16 DIAGNOSIS — E11.42 TYPE 2 DIABETES MELLITUS WITH DIABETIC POLYNEUROPATHY, WITH LONG-TERM CURRENT USE OF INSULIN (HCC): ICD-10-CM

## 2024-01-16 DIAGNOSIS — G47.33 OSA (OBSTRUCTIVE SLEEP APNEA): ICD-10-CM

## 2024-01-16 DIAGNOSIS — Z79.4 TYPE 2 DIABETES MELLITUS WITH DIABETIC POLYNEUROPATHY, WITH LONG-TERM CURRENT USE OF INSULIN (HCC): ICD-10-CM

## 2024-01-16 DIAGNOSIS — I10 ESSENTIAL HYPERTENSION: ICD-10-CM

## 2024-01-16 PROCEDURE — 95811 POLYSOM 6/>YRS CPAP 4/> PARM: CPT | Performed by: PREVENTIVE MEDICINE

## 2024-01-17 NOTE — PROCEDURES
Patient: LIGIA ORR  ID: [id number] Date: 1/16/2024 Exam No.:   MONTAGE: Standard  STUDY TYPE: Split Night  RECORDING TECHNIQUE:   After the scalp was prepared, gold plated electrodes were applied to the scalp according to the International 10-20 System. EEG (electroencephalogram) was continuously monitored from the O1-M2, O2-M1, C3-M2, C4-M1, F3-M2, and F4-M1. EOGs (electrooculograms) were monitored by electrodes placed at the left and right outer canthi. Chin EMG (electromyogram) was monitored by electrodes placed on the mentalis and sub-mentalis muscles. Nasal and oral airflow were monitored using a triple port thermocouple as well as oronasal pressure transducer. Respiratory effort was measured by inductive plethysmography technology employing abdominal and thoracic belts. Blood oxygen saturation and pulse were monitored by pulse oximetry. Heart rhythm was monitored by surface electrocardiogram. Leg EMG was studied using surface electrodes placed on left and right anterior tibialis. A microphone was used to monitor tracheal sounds and snoring. Body position was monitored and documented by technician observation.   SCORING CRITERIA:   A modification of the AASM manual for scoring of sleep and associated events was used. Obstructive apneas were scored by cessation of airflow for at least 10 seconds with continuing respiratory effort. Central apneas were scored by cessation of airflow for at least 10 seconds with no respiratory effort. Hypopneas were scored by a 30% or more reduction in airflow for at least 10 seconds accompanied by arterial oxygen desaturation of 3% or an arousal. For CMS (Medicare) patients, per AASM rule 1B, hypopneas are scored by 30% with mild reduction in airflow for at least 10 seconds accompanied by arterial saturation decreased at 4%.  DIAGNOSTIC  Study start time was 08:45:29 PM. Diagnostic recording time was 235 minutes with a total sleep time of 196 minutes resulting in a sleep  efficiency of 83.40%%. Sleep latency from the start of the study was 14 minutes and the latency from sleep to REM was 61 minutes. In total,65 arousals were scored for an arousal index of 19.9.  Respiratory:  There were a total of 84 apneas consisting of 53 obstructive apneas, 0 mixed apneas, and 31 central apneas. A total of 85 hypopneas were scored. The apnea index was 25.71 per hour and the hypopnea index was 26.02 per hour resulting in an overall AHI of 51.73. AHI during REM was 43.6 and AHI while supine was 51.73.  Oximetry:  There was a mean oxygen saturation of 92.0%. The minimum oxygen saturation during NREM sleep was 76.0% and in REM was 76.0%. Time spent during sleep with oxygen saturations <88% was 26.9 minutes.   Cardiac:  The highest heart rate seen while awake was 92 BPM while the highest heart rate during sleep was 81 BPM with an average sleeping heart rate of 63 BPM.  Limb Movements:  There were a total of 53 PLMs during sleep, which resulted in a PLM index of 16.2. There were 16 PLMs associated with arousals which resulted in a PLMS arousal index of 4.9.  TREATMENT:  Treatment recording time was 5h 13.0m (313 minutes) with a total sleep time of 3h 31.0m (211 minutes) resulting in a sleep efficiency of 67.4%. Sleep latency from the start of treatment was 00 minutes and REM latency from sleep onset was 0h 23.5m. The patient had 125 arousals in total for an arousal index of 35.5.  Respiratory:   There were 70 apneas in total consisting of 13 obstructive apneas, 57 central apneas, and 0 mixed apneas for an apnea index of 19.91. The patient had 16 hypopneas in total, which resulted in a hypopnea index of 4.55. The overall AHI was 24.45, with a REM AHI of 36.00, and a supine AHI of 35.37.   Oximetry:  The mean SaO2 during treatment was 94.0%. The minimum oxygen saturation in NREM was 82.0 % and in REM was 87.0%. Patient spent 5.8 minutes of TST with SaO2 <88%.  Cardiac:  The highest heart rate during sleep  was 81 BPM with an average sleeping heart rate of 62BPM.  Limb Movements:  There were a total of 103 PLMS during titration sleep time that resulted in an index of 29.3. There were 40 PLMS associated with arousals. This resulted in a PLM arousal index of 11.4.  Titration:   This was a fully attended sleep study. This test was technically adequate. CPAP was tried from 6 to 11.  Assessment:   DIAGNOSTIC: Severe Obstructive Sleep Apnea Hypopnea - AHI 51.7 Severe Nocturnal oxygen desaturation - gamaliel saturation 76% - saturations <88% below for 26.9 minutes of TST.  TREATMENT: Moderate Obstructive Sleep Apnea Hypopnea - AHI 24.5 NOTE: 66.3 of all events were classified as central apneas.   Mild Nocturnal oxygen desaturation - gamaliel saturation 82% - saturations <88% below for 5.8 minutes of TST.  Recommendation:   This patient has severe CHANTEL and with treatment he has significant treatment emergent centrals.  With 66% of all treatment events being classified as central apneas, this patient should be retitrated with either BIPAP ST or ASV.

## 2024-01-31 ENCOUNTER — OFFICE VISIT (OUTPATIENT)
Dept: SLEEP MEDICINE | Facility: MEDICAL CENTER | Age: 69
End: 2024-01-31
Attending: NURSE PRACTITIONER
Payer: MEDICARE

## 2024-01-31 VITALS
HEART RATE: 79 BPM | SYSTOLIC BLOOD PRESSURE: 118 MMHG | RESPIRATION RATE: 14 BRPM | OXYGEN SATURATION: 97 % | WEIGHT: 148 LBS | BODY MASS INDEX: 23.78 KG/M2 | DIASTOLIC BLOOD PRESSURE: 62 MMHG | HEIGHT: 66 IN

## 2024-01-31 DIAGNOSIS — G47.33 OSA (OBSTRUCTIVE SLEEP APNEA): ICD-10-CM

## 2024-01-31 PROCEDURE — 99214 OFFICE O/P EST MOD 30 MIN: CPT | Performed by: NURSE PRACTITIONER

## 2024-01-31 PROCEDURE — 3078F DIAST BP <80 MM HG: CPT | Performed by: NURSE PRACTITIONER

## 2024-01-31 PROCEDURE — 3074F SYST BP LT 130 MM HG: CPT | Performed by: NURSE PRACTITIONER

## 2024-01-31 PROCEDURE — 99213 OFFICE O/P EST LOW 20 MIN: CPT | Performed by: NURSE PRACTITIONER

## 2024-01-31 RX ORDER — ZOLPIDEM TARTRATE 5 MG/1
5 TABLET ORAL NIGHTLY PRN
Qty: 2 TABLET | Refills: 0 | Status: SHIPPED | OUTPATIENT
Start: 2024-01-31 | End: 2024-03-01

## 2024-01-31 ASSESSMENT — FIBROSIS 4 INDEX: FIB4 SCORE: 2.26

## 2024-01-31 ASSESSMENT — PATIENT HEALTH QUESTIONNAIRE - PHQ9: CLINICAL INTERPRETATION OF PHQ2 SCORE: 0

## 2024-01-31 NOTE — PROGRESS NOTES
Chief Complaint   Patient presents with    Follow-Up     Last seen 12/27/2023 Hortencia Saldana  SS results       HPI:  Abundio Logan is a 68 y.o. year old male here today for follow-up on study results.  Last seen 12/27/2023 by Dr. Saldana.  Patient previously diagnosed with CHANTEL in [facility] in 2015. Patient was advised to begin using BiPAP therapy but did not tolerate it for long.  Currently the patient reports snoring , apneas and fragmented sleep and wakes up with a dry mouth.  He also has a history of sxs of GERD at night.   He reports memory loss as well. The patient goes to bed at 9-11 pm and wakes up at 7 am. It usually takes the patient approximately 5-10 mins to fall asleep. The patient does not  feel refreshed and does nap during the day.  ( 1 -1.5   hours ) The patient has never  used tobacco.   . Pt does use cannabis ( Indica smokes or use a gummy  5-6 X /week).  This pt does drink 2 alcoholic beverages per month and has 1-2 caffeinated beverages daily.     Split-night sleep study done on 1/17/2024 showed evidence of severe CHANTEL.  AHI was 51.7.  Consisting of obstructive hypopneas at 26/h and central apneas of 9.5/h with obstructive apneas of 16.2/h.  Met split-night criteria and was titrated on CPAP between 5 to 11 cm/H2O.  At these pressures, patient's sleep apnea was not controlled.  Best pressure was 11 cm with an AHI of 8.25, but consisted of a central apnea index of 8.  Inconsistent titration.  Pressures were able to be extrapolated from study.      ROS: As per HPI and otherwise negative if not stated.    Past Medical History:   Diagnosis Date    Abdominal pain     Anxiety     Apnea, sleep     Arthritis     Back pain     Back pain     Chickenpox     Constipation     Daytime sleepiness     Depression     Diabetes (HCC)     Diarrhea     Difficulty swallowing     Dizziness     Earache     Eye drainage     Fatigue     Frequent urination     Frequent urination at night     GERD (gastroesophageal  "reflux disease)     Hearing difficulty     History of chickenpox     History of stroke     Hyperlipidemia     Hypertension     Influenza     Insomnia     Kidney stone     Mumps     Nasal drainage     Nausea     Painful joint     Ringing in ears     Sleep apnea     Snoring     Suicidal ideations     Sweat, sweating, excessive     Tonsillitis     Vision loss     Weakness     Wears glasses        Past Surgical History:   Procedure Laterality Date    CHOLECYSTECTOMY      LIVER BIOPSY      MO REMV 2ND CATARACT,CORN-SCLER SECTN      VASECTOMY         Family History   Problem Relation Age of Onset    Cancer Mother         breast    Breast Cancer Mother     Cancer Father         esophageal and stomach    Stroke Father     Stroke Paternal Grandfather        Allergies as of 01/31/2024 - Reviewed 01/31/2024   Allergen Reaction Noted    Azithromycin Rash 11/20/2015    Tetracycline Rash 06/29/2015    Erythromycin  07/12/2022    Lisinopril  08/20/2020    Metformin  04/11/2019        Vitals:  /62 (BP Location: Left arm, Patient Position: Sitting, BP Cuff Size: Adult)   Pulse 79   Resp 14   Ht 1.676 m (5' 6\")   Wt 67.1 kg (148 lb)   SpO2 97%     Current medications as of today   Current Outpatient Medications   Medication Sig Dispense Refill    bisoprolol-hydrochlorothiazide (ZIAC) 2.5-6.25 MG per tablet Take 1 Tablet by mouth every day. 90 Tablet 3    omeprazole (PRILOSEC) 20 MG delayed-release capsule Take 1 Capsule by mouth every day. 90 Capsule 3    atorvastatin (LIPITOR) 40 MG Tab Take 1 Tablet by mouth every day. 90 Tablet 1    tamsulosin (FLOMAX) 0.4 MG capsule Take 1 Capsule by mouth every day. 60 Capsule 0    divalproex (DEPAKOTE) 250 MG Tablet Delayed Response Take 1 Tablet by mouth at bedtime.      DULoxetine HCl 40 MG Cap DR Particles       propranolol (INDERAL) 10 MG Tab Take 10 mg by mouth 3 times a day as needed.      ondansetron (ZOFRAN) 4 MG Tab tablet Take 4 mg by mouth.      buPROPion (WELLBUTRIN SR) " 200 MG SR tablet Take 1 Tablet by mouth every morning.      GVOKE HYPOPEN 2-PACK 1 MG/0.2ML Solution Auto-injector inject 1 PEN NEEDLE subcutaneously if needed (FOR SEVERE HYPOGLYC...  (REFER TO PRESCRIPTION NOTES).      betamethasone dipropionate 0.05 % Cream Apply thin layer topically to affected area twice daily. 45 g 1    glucose blood (ONETOUCH VERIO) strip Check your blood sugar 2 to 3 times a day (Every morning before breakfast and every evening before dinner and sometimes before bed) 200 Strip 1    Insulin Pen Needle 32 G x 4 mm Use one insulin needle every day with insulin injection. 200 Each 1    Multiple Vitamins-Minerals (MULTIVITAMIN ADULTS PO) Take  by mouth.      temazepam (RESTORIL) 7.5 MG capsule Take 1 Capsule by mouth at bedtime as needed.  May repeat 1 time. for Sleep (may take 1 capssule  at bedtime and repeat X 1 for Sleep study) for up to 2 doses. Two caps make up  a one day dose.  Indications: Trouble Sleeping (Patient not taking: Reported on 1/31/2024) 2 Capsule 0    divalproex ER (DEPAKOTE ER) 500 MG TABLET SR 24 HR Take 500 mg by mouth every day. (Patient not taking: Reported on 1/31/2024)      insulin glargine (LANTUS SOLOSTAR) 100 UNIT/ML Solution Pen-injector injection Inject 27 Units under the skin every evening.       No current facility-administered medications for this visit.         Physical Exam:   Gen:           Alert and oriented, No apparent distress. Mood and affect appropriate, normal interaction with examiner.  Eyes:          PERRL, EOM intact, sclere white, conjunctive moist.  Ears:          Not examined.   Hearing:     Grossly intact.  Nose:          Normal, no lesions or deformities.  Dentition:    Good dentition.  Oropharynx:   Tongue normal, posterior pharynx without erythema or exudate.  Neck:        Supple, trachea midline, no masses.  Respiratory Effort: No intercostal retractions or use of accessory muscles.   Lung Auscultation:      Clear to auscultation  bilaterally; no rales, rhonchi or wheezing.  CV:            Regular rate and rhythm. No murmurs, rubs or gallops.  Abd:           Not examined.   Lymphadenopathy: Not examined.  Gait and Station: Normal.  Digits and Nails: No clubbing, cyanosis, petechiae, or nodes.   Cranial Nerves: II-XII grossly intact.  Skin:        No rashes, lesions or ulcers noted.               Ext:           No cyanosis or edema.      Assessment:  1. CHANTEL (obstructive sleep apnea)  Polysomnography Titration        Plan:  Split-night sleep study done on 1/17/2024 showed evidence of severe CHANTEL.  AHI was 51.7.  Consisting of obstructive hypopneas at 26/h and central apneas of 9.5/h with obstructive apneas of 16.2/h.  Met split-night criteria and was titrated on CPAP between 5 to 11 cm/H2O.  At these pressures, patient's sleep apnea was not controlled.  Best pressure was 11 cm with an AHI of 8.25, but consisted of a central apnea index of 8.  Inconsistent titration.  Pressures were able to be extrapolated from study.  Discussed results with patient.  Recommend return for titration study on BiPAP and if centrals are greater than 50%, consider ASV.  Results will be sent via message on [employer] with device ordered as per patient request.  Ambien is prescribed only for the night of the SS. I have obtained and reviewed patient utilization report from via680 prior to writing prescription for controlled substance II, III or IV. Based on the report and my clinical assessment the prescription is medically necessary. Pt was advised to use Ambien on as needed basis. Do not drive or use fast moving machinery after taking the medication for at least 6-8 hrs. Side affects were discussed in detail.       Please note that this dictation was created using voice recognition software. I have made every reasonable attempt to correct obvious errors, but it is possible there are errors of grammar and possibly content that I did not discover before finalizing the  note.

## 2024-02-17 ENCOUNTER — SLEEP STUDY (OUTPATIENT)
Dept: SLEEP MEDICINE | Facility: MEDICAL CENTER | Age: 69
End: 2024-02-17
Attending: NURSE PRACTITIONER
Payer: MEDICARE

## 2024-02-17 DIAGNOSIS — G47.33 OSA (OBSTRUCTIVE SLEEP APNEA): ICD-10-CM

## 2024-02-17 PROCEDURE — 95811 POLYSOM 6/>YRS CPAP 4/> PARM: CPT | Performed by: STUDENT IN AN ORGANIZED HEALTH CARE EDUCATION/TRAINING PROGRAM

## 2024-02-20 NOTE — PROCEDURES
Patient: LIGIA ORR  ID: [id number] Date: 2/17/2024   MONTAGE: Standard  STUDY TYPE: Treatment  RECORDING TECHNIQUE:   After the scalp was prepared, gold plated electrodes were applied to the scalp according to the International 10-20 System. EEG (electroencephalogram) was continuously monitored from the O1-M2, O2-M1, C3-M2, C4-M1, F3-M2, and F4-M1. EOGs (electrooculograms) were monitored by electrodes placed at the left and right outer canthi. Chin EMG (electromyogram) was monitored by electrodes placed on the mentalis and sub-mentalis muscles. Nasal and oral airflow were monitored using a triple port thermocouple as well as oronasal pressure transducer. Respiratory effort was measured by inductive plethysmography technology employing abdominal and thoracic belts. Blood oxygen saturation and pulse were monitored by pulse oximetry. Heart rhythm was monitored by surface electrocardiogram. Leg EMG was studied using surface electrodes placed on left and right anterior tibialis. A microphone was used to monitor tracheal sounds and snoring. Body position was monitored and documented by technician observation.   SCORING CRITERIA:   A modification of the AASM manual for scoring of sleep and associated events was used. Obstructive apneas were scored by cessation of airflow for at least 10 seconds with continuing respiratory effort. Central apneas were scored by cessation of airflow for at least 10 seconds with no respiratory effort. Hypopneas were scored by a 30% or more reduction in airflow for at least 10 seconds accompanied by arterial oxygen desaturation of 3% or an arousal. For CMS (Medicare) patients, per AASM rule 1B, hypopneas are scored by 30% with mild reduction in airflow for at least 10 seconds accompanied by arterial saturation decreased at 4%.  Study start time was 08:44:21 PM. Diagnostic recording time was 9h 20.0m with a total sleep time of 6h 50.0m resulting in a sleep efficiency of 73.21%%. Sleep latency  from the start of the study was 21 minutes and the latency from sleep to REM was 60 minutes. In total,205 arousals were scored for an arousal index of 30.0.  Respiratory:  There were a total of 130 apneas consisting of 29 obstructive apneas, 0 mixed apneas, and 101 central apneas. A total of 107 hypopneas were scored. The apnea index was 19.02 per hour and the hypopnea index was 15.66 per hour resulting in an overall AHI of 34.68. AHI during REM was 32.0 and AHI while supine was 40.83.  Central respiratory events accounted for 54% of respiratory events  Oximetry:  There was a mean oxygen saturation of 93.0%. The minimum oxygen saturation in NREM was 80.0 % and in REM was 82.0%. The patient spent 18.0 minutes of TST with SaO2 <88%.  Cardiac:  The highest heart rate seen while awake was 100 BPM while the highest heart rate during sleep was 95 BPM with an average sleeping heart rate of 77 BPM.  Limb Movements:  There were a total of 126 PLMs during sleep which resulted in a PLMS index of 18.4. Of these, 41 were associated with arousals which resulted in a PLMS arousal index of 6.0.  Titration:   BiPAP was tried from 9/5 to 13/9. ASV was tried from EPAP: 6, PS: 3/15 to EPAP: 13, PS: 3/12  This was a fully attended sleep study. This test was technically adequate. This patient was titrated on BiPAP starting at 9/5 cm of water pressure. Patient was titrated up to ASV EPAP 13 PS 3-12 cm of water pressure. Patient did best at ASV EPAP 13 pressure support 3-12 cm of water pressure. Patient spent 61 minutes at that pressure and the AHI was 14.8 which is considered Mild obstructive sleep apnea.     Impression:  1.  Patient used BiPAP and ASV during night of study  2.  Patient has known history of complex sleep apnea  3.  On ASV respiratory events did improve  4.  Oxygen saturations improved on ASV therapy  5.  Supine REM sleep was seen while on therapy  6.  PLM's noted    Recommendations:  I recommend ASV EPAP 13 pressure  support 3-12 cmH2O.  Patient used a S/M Nickie mask during night of study.     I also recommend 30 day compliance download to assess the efficacy to the recommended pressure, measure leak, apnea hypopnea index and compliance for further outpatient monitoring and management of PAP therapy. In some cases alternative treatment options may be proven effective in resolving sleep apnea. These options include upper airway surgery, the use of a dental orthotic, weight loss, or positional therapy. Clinical correlation is required. In general patients with sleep apnea are advised to avoid alcohol, sedatives and not to operate a motor vehicle while drowsy.  Untreated sleep apnea increases the risk for cardiovascular and neurovascular disease.

## 2024-05-29 ENCOUNTER — OFFICE VISIT (OUTPATIENT)
Dept: SLEEP MEDICINE | Facility: MEDICAL CENTER | Age: 69
End: 2024-05-29
Attending: NURSE PRACTITIONER
Payer: MEDICARE

## 2024-05-29 VITALS
HEART RATE: 70 BPM | BODY MASS INDEX: 24.59 KG/M2 | SYSTOLIC BLOOD PRESSURE: 106 MMHG | OXYGEN SATURATION: 99 % | RESPIRATION RATE: 14 BRPM | DIASTOLIC BLOOD PRESSURE: 70 MMHG | HEIGHT: 66 IN | WEIGHT: 153 LBS

## 2024-05-29 DIAGNOSIS — G47.31 COMPLEX SLEEP APNEA SYNDROME: ICD-10-CM

## 2024-05-29 DIAGNOSIS — G47.33 OSA (OBSTRUCTIVE SLEEP APNEA): ICD-10-CM

## 2024-05-29 ASSESSMENT — FIBROSIS 4 INDEX: FIB4 SCORE: 2.3

## 2024-05-29 NOTE — PROGRESS NOTES
"Chief Complaint   Patient presents with    Follow-Up       Last Office Visit 01/31/2025 with Darryn Jaxson    1st Compliance: Yes    DME: [employer]    Settings: 5 to 11 cm/H2O    Wireless Data? YES, [employer]   OAT ? : N/A , DDS who provided OAT? N/A       HPI:  Abundio Logan is a 69 y.o. year old male here today for follow-up on first compliance on ASV.  Last seen 1/31/2024.  Previous note, \" tested for sleep ap[facility]by [facility] using a split night PSG on 5/15/2015.  Patient was advised to begin using PAP therapy but did not tolerate it for long.  Currently the patient reports snoring , apneas and fragmented sleep and wakes up with a dry mouth.  He also has a history of sxs of GERD at night.   He reports memory loss as well. The patient goes to bed at 9-11 pm and wakes up at 7 am. It usually takes the patient approximately 5-10 mins to fall asleep. The patient does not  feel refreshed and does nap during the day.  ( 1 -1.5   hours ) The patient has never  used tobacco.   . Pt does use cannabis ( Indica smokes or use a gummy  5-6 X /week).  This pt does drink 2 alcoholic beverages per month and has 1-2 caffeinated beverages daily.\"      Was placed for ASV based on titration study results.  Patient presents for first compliance on device.  Patient received device in March 2024.  Started using nasal pillows, but switched over the nose nasal mask with memory foam.  Denies any difficulty tolerating mask fit or pressures at this time.  Patient does average between 6 to 8 hours of sleep and wakes up once nightly to use the bathroom and sometimes cannot fall asleep so will not wear device.  Overall notes improvement in symptoms and sleep quality since starting on ASV.  Also states memory problems are getting slightly better.  Is followed by neurology and currently undergoing PT for balance issues as well.    30-day compliance reviewed shows 93% use with an average time of 5 hours and 31 minutes and " resultant AHI of 6.8 consisting of a hypopnea index of 5.3 apnea index of 1.5.  No evidence of persistent mask leak.    Titration study (2/17/2024):  1.  Patient used BiPAP and ASV during night of study  2.  Patient has known history of complex sleep apnea  3.  On ASV respiratory events did improve  4.  Oxygen saturations improved on ASV therapy  5.  Supine REM sleep was seen while on therapy  6.  PLM's noted     Recommendations:  I recommend ASV EPAP 13 pressure support 3-12 cmH2O.  Patient used a S/M Nickie mask during night of study.    Split-night sleep study done on 1/17/2024 showed evidence of severe CHANTEL.  AHI was 51.7.  Consisting of obstructive hypopneas at 26/h and central apneas of 9.5/h with obstructive apneas of 16.2/h.  Met split-night criteria and was titrated on CPAP between 5 to 11 cm/H2O.  At these pressures, patient's sleep apnea was not controlled.  Best pressure was 11 cm with an AHI of 8.25, but consisted of a central apnea index of 8.  Inconsistent titration.  Pressures were able to be extrapolated from study.     ROS: As per HPI and otherwise negative if not stated.    Past Medical History:   Diagnosis Date    Abdominal pain     Anxiety     Apnea, sleep     Arthritis     Back pain     Back pain     Chickenpox     Constipation     Daytime sleepiness     Depression     Diabetes (HCC)     Diarrhea     Difficulty swallowing     Dizziness     Earache     Eye drainage     Fatigue     Frequent urination     Frequent urination at night     GERD (gastroesophageal reflux disease)     Hearing difficulty     History of chickenpox     History of stroke     Hyperlipidemia     Hypertension     Influenza     Insomnia     Kidney stone     Mumps     Nasal drainage     Nausea     Painful joint     Ringing in ears     Sleep apnea     Snoring     Suicidal ideations     Sweat, sweating, excessive     Tonsillitis     Vision loss     Weakness     Wears glasses        Past Surgical History:   Procedure Laterality Date  "   CHOLECYSTECTOMY      LIVER BIOPSY      CO REMV 2ND CATARACT,CORN-SCLER SECTN      VASECTOMY         Family History   Problem Relation Age of Onset    Cancer Mother         breast    Breast Cancer Mother     Cancer Father         esophageal and stomach    Stroke Father     Stroke Paternal Grandfather        Allergies as of 05/29/2024 - Reviewed 05/29/2024   Allergen Reaction Noted    Azithromycin Rash 11/20/2015    Tetracycline Rash 06/29/2015    Erythromycin  07/12/2022    Lisinopril  08/20/2020    Metformin  04/11/2019        Vitals:  /70 (BP Location: Left arm, Patient Position: Sitting, BP Cuff Size: Adult)   Pulse 70   Resp 14   Ht 1.676 m (5' 6\")   Wt 69.4 kg (153 lb)   SpO2 99%     Current medications as of today   Current Outpatient Medications   Medication Sig Dispense Refill    atorvastatin (LIPITOR) 40 MG Tab Take 1 Tablet by mouth every day. 90 Tablet 1    betamethasone dipropionate 0.05 % Cream Apply thin layer topically to affected area twice daily. 45 g 1    tamsulosin (FLOMAX) 0.4 MG capsule Take 1 Capsule by mouth every day. 90 Capsule 3    bisoprolol-hydrochlorothiazide (ZIAC) 2.5-6.25 MG per tablet Take 1 Tablet by mouth every day. 90 Tablet 3    omeprazole (PRILOSEC) 20 MG delayed-release capsule Take 1 Capsule by mouth every day. 90 Capsule 3    divalproex (DEPAKOTE) 250 MG Tablet Delayed Response Take 1 Tablet by mouth at bedtime.      DULoxetine HCl 40 MG Cap DR Particles       propranolol (INDERAL) 10 MG Tab Take 10 mg by mouth 3 times a day as needed.      ondansetron (ZOFRAN) 4 MG Tab tablet Take 4 mg by mouth.      buPROPion (WELLBUTRIN SR) 200 MG SR tablet Take 1 Tablet by mouth every morning.      GVOKE HYPOPEN 2-PACK 1 MG/0.2ML Solution Auto-injector inject 1 PEN NEEDLE subcutaneously if needed (FOR SEVERE HYPOGLYC...  (REFER TO PRESCRIPTION NOTES).      glucose blood (ONETOUCH VERIO) strip Check your blood sugar 2 to 3 times a day (Every morning before breakfast and every " evening before dinner and sometimes before bed) 200 Strip 1    Insulin Pen Needle 32 G x 4 mm Use one insulin needle every day with insulin injection. 200 Each 1    Multiple Vitamins-Minerals (MULTIVITAMIN ADULTS PO) Take  by mouth.      divalproex ER (DEPAKOTE ER) 500 MG TABLET SR 24 HR Take 500 mg by mouth every day. (Patient not taking: Reported on 1/31/2024)      insulin glargine (LANTUS SOLOSTAR) 100 UNIT/ML Solution Pen-injector injection Inject 27 Units under the skin every evening.       No current facility-administered medications for this visit.         Physical Exam:   Gen:           Alert and oriented, No apparent distress. Mood and affect appropriate, normal interaction with examiner.  Eyes:          PERRL, EOM intact, sclere white, conjunctive moist.  Ears:          Not examined.   Hearing:     Grossly intact.  Nose:          Normal, no lesions or deformities.  Dentition:    Good dentition.  Oropharynx:   Tongue normal, posterior pharynx without erythema or exudate.  Neck:        Supple, trachea midline, no masses.  Respiratory Effort: No intercostal retractions or use of accessory muscles.   Lung Auscultation:      Clear to auscultation bilaterally; no rales, rhonchi or wheezing.  CV:            Regular rate and rhythm. No murmurs, rubs or gallops.  Abd:           Not examined.   Lymphadenopathy: Not examined.  Gait and Station: Normal.  Digits and Nails: No clubbing, cyanosis, petechiae, or nodes.   Cranial Nerves: II-XII grossly intact.  Skin:        No rashes, lesions or ulcers noted.               Ext:           No cyanosis or edema.      Assessment:  1. CHANTEL (obstructive sleep apnea)  DME Mask and Supplies      2. Complex sleep apnea syndrome  DME Mask and Supplies        Plan:  And is using and benefiting from ASV therapy.  Compliance shows adequate use and control of complex sleep apnea.  Order placed for mask and supplies which will be good for 1 year.  Request follow-up in 6 months for  compliance reassessment.  Overall note adequate control of complex sleep apnea on current settings.    Please note that this dictation was created using voice recognition software. I have made every reasonable attempt to correct obvious errors, but it is possible there are errors of grammar and possibly content that I did not discover before finalizing the note.

## 2024-07-06 PROBLEM — M65.949 FLEXOR TENOSYNOVITIS OF FINGER: Status: ACTIVE | Noted: 2024-07-06

## 2024-08-05 PROBLEM — G36.0: Status: ACTIVE | Noted: 2023-04-03

## 2024-08-05 PROBLEM — R25.1 TREMOR: Status: ACTIVE | Noted: 2024-03-28

## 2024-08-05 PROBLEM — M48.061 DEGENERATIVE LUMBAR SPINAL STENOSIS: Status: ACTIVE | Noted: 2022-10-25

## 2024-08-05 PROBLEM — M47.816 LUMBAR SPONDYLOSIS: Status: ACTIVE | Noted: 2022-11-28

## 2024-08-05 PROBLEM — R13.12 OROPHARYNGEAL DYSPHAGIA: Status: ACTIVE | Noted: 2024-08-05

## 2024-08-05 PROBLEM — G37.9: Status: ACTIVE | Noted: 2023-04-03

## 2024-08-05 PROBLEM — F31.81 BIPOLAR 2 DISORDER (HCC): Status: ACTIVE | Noted: 2023-12-01

## 2024-11-18 ENCOUNTER — OFFICE VISIT (OUTPATIENT)
Dept: SLEEP MEDICINE | Facility: MEDICAL CENTER | Age: 69
End: 2024-11-18
Attending: NURSE PRACTITIONER
Payer: MEDICARE

## 2024-11-18 VITALS
HEIGHT: 66 IN | HEART RATE: 76 BPM | BODY MASS INDEX: 25.23 KG/M2 | RESPIRATION RATE: 16 BRPM | OXYGEN SATURATION: 96 % | SYSTOLIC BLOOD PRESSURE: 114 MMHG | WEIGHT: 157 LBS | DIASTOLIC BLOOD PRESSURE: 70 MMHG

## 2024-11-18 DIAGNOSIS — G47.33 OSA (OBSTRUCTIVE SLEEP APNEA): ICD-10-CM

## 2024-11-18 DIAGNOSIS — G47.39 COMPLEX SLEEP APNEA SYNDROME: ICD-10-CM

## 2024-11-18 PROCEDURE — 3078F DIAST BP <80 MM HG: CPT | Performed by: NURSE PRACTITIONER

## 2024-11-18 PROCEDURE — 3074F SYST BP LT 130 MM HG: CPT | Performed by: NURSE PRACTITIONER

## 2024-11-18 PROCEDURE — 99214 OFFICE O/P EST MOD 30 MIN: CPT | Performed by: NURSE PRACTITIONER

## 2024-11-18 PROCEDURE — 99213 OFFICE O/P EST LOW 20 MIN: CPT | Performed by: NURSE PRACTITIONER

## 2024-11-18 ASSESSMENT — FIBROSIS 4 INDEX: FIB4 SCORE: 1.43

## 2024-11-18 NOTE — PROGRESS NOTES
Chief Complaint   Patient presents with    Apnea     Last Office Visit 05/29/2024 WITH ABBEY RAMIRES    1st Compliance: NO    DME:  [employer] / ph [phone] / fx [phone]    Settings: AutoCPAP 5-11    Wireless Data? YES, Resmed          HPI:  Abundio Logan is a 69 y.o. year old male here today for follow-up on complex sleep apnea.  Last seen 5/29/2024 by me for first compliance on new ASV.     Previously diagnosed with sleep apnea by the [facility] using a split night PSG on 5/15/2015. Patient was advised to begin using PAP therapy but did not tolerate it for long. Currently the patient reports snoring , apneas and fragmented sleep and wakes up with a dry mouth. He also has a history of sxs of GERD at night. He reports memory loss as well. The patient goes to bed at 9-11 pm and wakes up at 7 am. It usually takes the patient approximately 5-10 mins to fall asleep. The patient does not feel refreshed and does nap during the day. ( 1 -1.5 hours ).     Was placed for ASV based on titration study results.  Patient presents for first compliance on device.  Patient received device in March 2024.  Started using nasal pillows, but switched over the nose nasal mask with memory foam.  Denies any difficulty tolerating mask fit or pressures at this time.  Patient does average between 6 to 8 hours of sleep and wakes up once nightly to use the bathroom and sometimes cannot fall asleep so will not wear device.  Overall notes improvement in symptoms and sleep quality since starting on ASV.  Also states memory problems are getting slightly better.  Is followed by neurology and currently undergoing PT for balance issues as well.     30-day compliance reviewed shows 100% use with an average time of 7 hours and 25 minutes and a residual AHI of 4.4 with no evidence of persistent mask leak.      ROS: As per HPI and otherwise negative if not stated.    Past Medical History:   Diagnosis Date    Anxiety      Apnea, sleep     Arthritis     Chickenpox     Constipation     Daytime sleepiness     Depression     Diabetes (HCC)     Diarrhea     Difficulty swallowing     Dizziness     Eye drainage     Fatigue     Frequent urination     Frequent urination at night     GERD (gastroesophageal reflux disease)     Hearing difficulty     History of chickenpox     History of stroke     Hyperlipidemia     Hypertension     Influenza     Insomnia     Kidney stone     Mumps     Nasal drainage     Nausea     PONV (postoperative nausea and vomiting)     Ringing in ears     Sleep apnea     Snoring     Suicidal ideations     Sweat, sweating, excessive     Tonsillitis     Vision loss     Weakness     Wears glasses        Past Surgical History:   Procedure Laterality Date    FINGER OR HAND INCISION AND DRAINAGE Right 7/6/2024    Procedure: INCISION AND DRAINAGE, HAND;  Surgeon: Duane Cheek M.D.;  Location: [facility];  Service: Orthopedics    NJ NEUROPLASTY & OR TRANSPOS MEDIAN NRV CARPAL ESTUARDO Right 6/25/2024    Procedure: RIGHT WRIST CARPAL TUNNEL RELEASE,;  Surgeon: Jamil Guerrero M.D.;  Location: [facility];  Service: Orthopedics    PB INCISE FINGER TENDON SHEATH Right 6/25/2024    Procedure: RIGHT RING FINGER A-1 PULLEY RELEASE,;  Surgeon: Jamil Guerrero M.D.;  Location: [facility];  Service: Orthopedics    PB EXCIS TENDON SHEATH LESION, HAND/FINGER Right 6/25/2024    Procedure: RIGHT HAND MASS EXCISION;  Surgeon: Jamil Guerrero M.D.;  Location: [facility];  Service: Orthopedics    CHOLECYSTECTOMY      LIVER BIOPSY      NJ REMV 2ND CATARACT,CORN-SCLER SECTN      VASECTOMY         Family History   Problem Relation Age of Onset    Cancer Mother         breast    Breast Cancer Mother     Cancer Father         esophageal and stomach    Stroke Father     Stroke Paternal Grandfather        Allergies as of 11/18/2024 - Reviewed 11/18/2024   Allergen Reaction  "Noted    Azithromycin Rash 11/20/2015    Tetracycline Rash 06/29/2015    Erythromycin  07/12/2022    Lisinopril  08/20/2020    Ozempic (0.25 or 0.5 mg-dose) [semaglutide] Diarrhea 07/06/2024    Metformin  04/11/2019        Vitals:  /70 (BP Location: Left arm, Patient Position: Sitting, BP Cuff Size: Adult)   Pulse 76   Resp 16   Ht 1.676 m (5' 6\")   Wt 71.2 kg (157 lb)   SpO2 96%     Current medications as of today   Current Outpatient Medications   Medication Sig Dispense Refill    atorvastatin (LIPITOR) 40 MG Tab Take 1 Tablet by mouth every day. 90 Tablet 1    donepezil (ARICEPT) 5 MG Tab Take 5 mg by mouth every evening.      ibuprofen (MOTRIN) 400 MG Tab Take 1 Tablet by mouth every 6 hours as needed for Moderate Pain. 90 Tablet 0    Empagliflozin (JARDIANCE) 25 MG Tab Take 1 Tablet by mouth every day.      insulin lispro 100 UNIT/ML INJ Inject 1 Units under the skin 3 times a day before meals. Sliding scale      Insulin Glargine, 2 Unit Dial, (TOUJEO MAX SOLOSTAR) 300 UNIT/ML Solution Pen-injector Inject 28 Units under the skin every evening.      Probiotic Product (ALIGN) 4 MG Cap Take 1 Capsule by mouth every day.      betamethasone dipropionate 0.05 % Cream Apply thin layer topically to affected area twice daily. 45 g 1    tamsulosin (FLOMAX) 0.4 MG capsule Take 1 Capsule by mouth every day. 90 Capsule 3    bisoprolol-hydrochlorothiazide (ZIAC) 2.5-6.25 MG per tablet Take 1 Tablet by mouth every day. 90 Tablet 3    omeprazole (PRILOSEC) 20 MG delayed-release capsule Take 1 Capsule by mouth every day. 90 Capsule 3    divalproex (DEPAKOTE) 250 MG Tablet Delayed Response Take 1 Tablet by mouth at bedtime.      DULoxetine HCl 40 MG Cap DR Particles       propranolol (INDERAL) 10 MG Tab Take 10 mg by mouth 3 times a day as needed.      ondansetron (ZOFRAN) 4 MG Tab tablet Take 4 mg by mouth.      buPROPion (WELLBUTRIN SR) 200 MG SR tablet Take 1 Tablet by mouth every morning.      GVOKE HYPOPEN 2-PACK " 1 MG/0.2ML Solution Auto-injector inject 1 PEN NEEDLE subcutaneously if needed (FOR SEVERE HYPOGLYC...  (REFER TO PRESCRIPTION NOTES).      glucose blood (ONETOUCH VERIO) strip Check your blood sugar 2 to 3 times a day (Every morning before breakfast and every evening before dinner and sometimes before bed) 200 Strip 1    Insulin Pen Needle 32 G x 4 mm Use one insulin needle every day with insulin injection. 200 Each 1    Multiple Vitamins-Minerals (MULTIVITAMIN ADULTS PO) Take  by mouth.       No current facility-administered medications for this visit.         Physical Exam:   Gen:           Alert and oriented, No apparent distress. Mood and affect appropriate, normal interaction with examiner.  Eyes:          PERRL, EOM intact, sclere white, conjunctive moist.  Ears:          Not examined.   Hearing:     Grossly intact.  Nose:          Normal, no lesions or deformities.  Dentition:    Good dentition.  Oropharynx:   Tongue normal, posterior pharynx without erythema or exudate.  Neck:        Supple, trachea midline, no masses.  Respiratory Effort: No intercostal retractions or use of accessory muscles.   Lung Auscultation:      Clear to auscultation bilaterally; no rales, rhonchi or wheezing.  CV:            Regular rate and rhythm. No murmurs, rubs or gallops.  Abd:           Not examined.   Lymphadenopathy: Not examined.  Gait and Station: Normal.  Digits and Nails: No clubbing, cyanosis, petechiae, or nodes.   Cranial Nerves: II-XII grossly intact.  Skin:        No rashes, lesions or ulcers noted.               Ext:           No cyanosis or edema.      Assessment:  1. CHANTEL (obstructive sleep apnea)        2. Complex sleep apnea syndrome          Plan:  Using and benefiting from ASV therapy.  Compliance shows adequate use and control of CHANTEL.  Order placed for mask and supplies.  Patient to follow-up in 6 months, but can be seen sooner if needed.    Please note that this dictation was created using voice  recognition software. I have made every reasonable attempt to correct obvious errors, but it is possible there are errors of grammar and possibly content that I did not discover before finalizing the note.

## 2025-04-02 ENCOUNTER — OFFICE VISIT (OUTPATIENT)
Dept: NEUROLOGY | Facility: MEDICAL CENTER | Age: 70
End: 2025-04-02
Attending: PSYCHIATRY & NEUROLOGY
Payer: MEDICARE

## 2025-04-02 VITALS
SYSTOLIC BLOOD PRESSURE: 128 MMHG | HEART RATE: 83 BPM | DIASTOLIC BLOOD PRESSURE: 74 MMHG | BODY MASS INDEX: 27.21 KG/M2 | WEIGHT: 169.31 LBS | RESPIRATION RATE: 16 BRPM | HEIGHT: 66 IN | TEMPERATURE: 97.3 F | OXYGEN SATURATION: 95 %

## 2025-04-02 DIAGNOSIS — G25.0 ESSENTIAL TREMOR: ICD-10-CM

## 2025-04-02 PROCEDURE — 3078F DIAST BP <80 MM HG: CPT | Performed by: PSYCHIATRY & NEUROLOGY

## 2025-04-02 PROCEDURE — 3074F SYST BP LT 130 MM HG: CPT | Performed by: PSYCHIATRY & NEUROLOGY

## 2025-04-02 PROCEDURE — 99212 OFFICE O/P EST SF 10 MIN: CPT | Performed by: PSYCHIATRY & NEUROLOGY

## 2025-04-02 PROCEDURE — 99215 OFFICE O/P EST HI 40 MIN: CPT | Performed by: PSYCHIATRY & NEUROLOGY

## 2025-04-02 ASSESSMENT — PATIENT HEALTH QUESTIONNAIRE - PHQ9: CLINICAL INTERPRETATION OF PHQ2 SCORE: 2

## 2025-04-02 ASSESSMENT — FIBROSIS 4 INDEX: FIB4 SCORE: 1.43

## 2025-04-02 NOTE — PROGRESS NOTES
"[facility] NEUROLOGY  MULTIPLE SCLEROSIS & NEUROIMMUNOLOGY  FOLLOW-UP VISIT    DISEASE SUMMARY:  Principal neurologic diagnosis: abnormal MRI, possible optic neuritis  Diagnosis of MS: n/a  Disease History:  - 2017: ION OD  - 2021: onset of cognitive symptoms (language, memory)  - 4/2023: worsened visual acuity OS shortly after cataract surgery, treated with IVMP x5 days without improvement  Disease course at onset: possible optic neuritis  Current disease course: possible optic neuritis  Previous disease therapies:  - none  Current disease therapies:  - none  Symptomatic therapies:  -   CSF (4/5/2023):  - OCBs: negative  - protein: 56 (elevated)  Other Testing:  - anti-MOG Ab (4/4/0223, CBA): <1:10  - CNS demyelinating disease reflex panel (7/18/2023): negative (CBA)  MRI orbits:  - 4/3/2023: \"questionable restricted diffusion and subtle contrast enhancement within the posterior wall of the ocular globe in the expected location of the optic disc... nonspecific but raises a possibility of anterior ischemic optic neuropathy...\"  MRI head:  - 4/4/2023: \"altered white matter signal affecting the callosal septal interface and gonzalez-ventricular white matter particularly in the parietal white matter...\"  MRI cervical spine:  - 2/7/2023: \"no hyperintense T2 signal alteration and or contrast enhancement demonstrated within the substance of the spinal cord to suggest a demyelinating process.\"  MRI thoracic spine:  - 4/4/2023: \"normal... no evidence of acute or chronic demyelinating plaques...\"  Immunizations:  - influenza?:   - Pneumonia?:  - SARS-CoV-2?:   Cancer Screens:  - mammogram:   - PAP?:   - skin check:     CC: gait dysfunction, tremors    INTERVAL HISTORY:  Abundio Logan is a 69 y.o. man with a mild cognitive impairment, possible optic neuritis, as well as HTN, HLD, T2DM, ischemic optic neuropathy (ION, right), and MDD, GUSTAVO.  [facility] on [facility]/2023.  At that time I recommended no additional workup.  " "Today, he was accompanied by his wife, and he provided the following interval history:    Mr. Logan has been experiencing \"spastic\" movements.  He is \"unable to move forward.\"  He feels as if he needs to take \"baby quick steps.\"    He has noticed memory changes.  He is unable to recall events.  There is word-finding difficulty.  Despite being on episode 8 of a TV series he cannot recall the title.  He forgets important dates (like his anniversary).    He continues to have difficulty with vision in the right eye.  He doesn't drive at night.  His wife drove them to today's appointment.    The termor is unchanged.  He takes propranolol inconsistently.    He has some bladder dysfunction which he attributes to BPH.    His voice is not hoarse.  There have been no falls.    He has been struggling with depression, anxiety, and \"bipolar disorder.\"    MEDICATIONS:  Current Outpatient Medications   Medication Sig    atorvastatin (LIPITOR) 40 MG Tab Take 1 Tablet by mouth every day.    bisoprolol-hydrochlorothiazide (ZIAC) 2.5-6.25 MG per tablet Take 1 Tablet by mouth every day.    Empagliflozin (JARDIANCE) 25 MG Tab Take 1 Tablet by mouth every day.    insulin lispro 100 UNIT/ML INJ Inject 1 Units under the skin 3 times a day before meals. Sliding scale    Insulin Glargine, 2 Unit Dial, (TOUJEO MAX SOLOSTAR) 300 UNIT/ML Solution Pen-injector Inject 28 Units under the skin every evening.    Probiotic Product (ALIGN) 4 MG Cap Take 1 Capsule by mouth every day.    betamethasone dipropionate 0.05 % Cream Apply thin layer topically to affected area twice daily.    tamsulosin (FLOMAX) 0.4 MG capsule Take 1 Capsule by mouth every day.    omeprazole (PRILOSEC) 20 MG delayed-release capsule Take 1 Capsule by mouth every day.    divalproex (DEPAKOTE) 250 MG Tablet Delayed Response Take 1 Tablet by mouth at bedtime.    DULoxetine HCl 40 MG Cap DR Particles     propranolol (INDERAL) 10 MG Tab Take 10 mg by mouth 3 times a day as needed.    " ondansetron (ZOFRAN) 4 MG Tab tablet Take 4 mg by mouth.    GVOKE HYPOPEN 2-PACK 1 MG/0.2ML Solution Auto-injector inject 1 PEN NEEDLE subcutaneously if needed (FOR SEVERE HYPOGLYC...  (REFER TO PRESCRIPTION NOTES).    glucose blood (ONETOUCH VERIO) strip Check your blood sugar 2 to 3 times a day (Every morning before breakfast and every evening before dinner and sometimes before bed)    Insulin Pen Needle 32 G x 4 mm Use one insulin needle every day with insulin injection.    Multiple Vitamins-Minerals (MULTIVITAMIN ADULTS PO) Take  by mouth.    donepezil (ARICEPT) 5 MG Tab Take 5 mg by mouth every evening. (Patient not taking: Reported on 4/2/2025)    ibuprofen (MOTRIN) 400 MG Tab Take 1 Tablet by mouth every 6 hours as needed for Moderate Pain. (Patient not taking: Reported on 4/2/2025)    buPROPion (WELLBUTRIN SR) 200 MG SR tablet Take 1 Tablet by mouth every morning. (Patient not taking: Reported on 4/2/2025)     MEDICAL, SOCIAL, AND FAMILY HISTORY:  There is no change in the patient's ROS or medical, social, or family histories since the previous visit on 11/15/2023.    REVIEW OF SYSTEMS:  A ROS was completed.  Pertinent positives and negatives were included in the HPI, above.  All other systems were reviewed and are negative.    PHYSICAL EXAM:  General/Medical:  - NAD  - voice was not hoarse    Neuro:  MENTAL STATUS: awake and alert; no deficits of speech or language; oriented to conversation; affect was appropriate to situation; pleasant, cooperative    CRANIAL NERVES:    II: acuity: NT, fields: OD: difficulty with finger count in the medical hemisphere, OS: difficulty with finger count in the medial, superior quadrant, pupils: NT, discs: NT    III/IV/VI: versions: grossly intact    V: facial sensation: symmetric    VII: facial expression: symmetric, no hypomimia (blink rate was normal)    VIII: hearing: intact to voice    IX/X: palate: elevates symmetrically    XI: shoulder shrug: symmetric    XII: tongue:  midline    MOTOR:  - bulk: normal throughout  - tone: upper extremities: normal, lower extremities: increased (heel raised off table, then began to tremor fairly violently)  Upper Extremity Strength (R/L)    5/5   Elbow flexion 5/5   Elbow extension 5/5   Shoulder abduction 5/5     Lower Extremity Strength (R/L)   Hip flexion 5/5   Knee extension 5/5   Knee flexion 5/5   Ankle plantarflexion 5/5   Ankle dorsiflexion 5/5     - pronator drift: absent  - abnormal movements: action tremor in the upper extremities, no obvious resting tremor, intermittent tremor on the right when walking    SENSATION:  - light touch: grossly intact throughout  - vibration (R/L, seconds): NT at the great toes  - pinprick: NT  - proprioception: NT  - Romberg: NT    COORDINATION:  - finger to nose: intact, but with tremor, bilaterally  - finger tapping: rapid and accurate, bilaterally, no decrement    REFLEXES:  Reflex Right Left   BR 2+ 2+   Biceps 2+ (brisk) 2+ (brisk)   Triceps NT NT   Patellae 2+ (brisk) 2+ (brisk)   Achilles NT NT   Toes mute mute   - Juan's: absent, bilaterally    GAIT:  - normal at times with good arm swing and stride length, pivot turn  - intrusions of tremulousness, especially when asked to walk on heels and toes and attempt tandem gait    REVIEW OF IMAGING STUDIES:  No additional data since the last visit.    REVIEW OF LABORATORY STUDIES:  11/11/2024:  - CBC w/ diff: WNL  - CMP: notable for glucose: 119    MOCA:  27/30    ASSESSMENT:  Abundio Logan is a 69 y.o. man with possible FND as well as a history of ION (left), mild cognitive impairment, essential tremor as well as HTN, HLD, T2DM, ischemic optic neuropathy (ION, right), and MDD, GUSTAVO.  I suspect Mr. 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  This is supported by the inconsistent/intermittent nature of his gate symptoms which take on an astasia abasia-type character.  I discussed Mr. Logan's presentation with Dr. Contreras, and  "he has agreed to see him once.    PLAN:  Gait Disorder:  - referral to Dr. Contreras  - continue exercise  - no additional workup at this time    Follow-Up:  - Return if symptoms worsen or fail to improve.    Signed: Mo Cadet M.D.    BILLING DOCUMENTATION:   I spent 41 minutes reviewing the medical record, interviewing and examining the patient, discussing my impression (see \"assessment\" above), and coordinating care.  "

## 2025-04-02 NOTE — Clinical Note
REFERRAL APPROVAL NOTICE         Sent on April 2, 2025                   Fabian Logan  [address] [place] [address]                   Dear Mr. Logan,    After a careful review of the medical information and benefit coverage, [facility] has processed your referral. See below for additional details.    If applicable, you must be actively enrolled with your insurance for coverage of the authorized service. If you have any questions regarding your coverage, please contact your insurance directly.    REFERRAL INFORMATION   Referral #:  [id number]  Referred-To Department    Referred-By Provider:  Neurology    oM Cadet M.D.   [place]      [address]  [facility] [place] [address]  [phone] [address]  [place] [facility] [place] [id number] [phone]    Referral Start Date:  04/02/2025  Referral End Date:   04/02/2026           SCHEDULING  If you do not already have an appointment, please call [phone] to make an appointment.   MORE INFORMATION  As a reminder, [facility] ownership has changed, meaning this location is now owned and operated by [facility]. As such, we want to clarify that our patients should expect to receive two separate bills for the services received at [facility] - one representing the [facility] facility fees as the owner of the establishment, and the other to represent the physician's services and subsequent fees. You can speak with your insurance carrier for a pricing estimate by calling the customer service number on the back of your card and ask about charges for a hospital outpatient visit.  If you do not already have a [employer] account, sign up at: [email or web address]  You can access your medical information, make appointments, see lab results, billing information, and more.  If you have questions regarding this referral, please contact  the [facility] Referrals department at:             [phone]. Monday - Friday 7:30AM - 5:00PM.       Sincerely,  [facility]

## 2025-07-22 ENCOUNTER — OFFICE VISIT (OUTPATIENT)
Dept: SLEEP MEDICINE | Facility: MEDICAL CENTER | Age: 70
End: 2025-07-22
Payer: MEDICARE

## 2025-07-22 VITALS
RESPIRATION RATE: 16 BRPM | OXYGEN SATURATION: 98 % | HEIGHT: 66 IN | HEART RATE: 78 BPM | DIASTOLIC BLOOD PRESSURE: 72 MMHG | SYSTOLIC BLOOD PRESSURE: 114 MMHG | WEIGHT: 161.7 LBS | BODY MASS INDEX: 25.99 KG/M2

## 2025-07-22 DIAGNOSIS — G47.31 SECONDARY CENTRAL SLEEP APNEA: Primary | ICD-10-CM

## 2025-07-22 PROCEDURE — 99214 OFFICE O/P EST MOD 30 MIN: CPT

## 2025-07-22 PROCEDURE — 3078F DIAST BP <80 MM HG: CPT

## 2025-07-22 PROCEDURE — 3074F SYST BP LT 130 MM HG: CPT

## 2025-07-22 RX ORDER — DULAGLUTIDE 0.75 MG/.5ML
INJECTION, SOLUTION SUBCUTANEOUS
COMMUNITY

## 2025-07-22 ASSESSMENT — FIBROSIS 4 INDEX: FIB4 SCORE: 1.45

## 2025-07-22 NOTE — PROGRESS NOTES
[facility] Follow-up Visit    CC:   Chief Complaint   Patient presents with    Follow-Up     Last Office Visit 11/18/2024 with Romina RAMIRES   ASV           HPI:    Abundio Logan is a 70 y.o.male present today for ongoing management of secondary CSA on ASV. Patient was last seen by sleep medicine 11/18/24 by Darryn Puri. The primary reason for today's visit is routine follow up for data evaluation and renewal of mask and supplies and to discuss issues he has been having with the machine. Patient uses the machine most nights and reports benefit. Patient reports dry mouth , mild leak , and the pressure wakes him up during the night and is very high. Patient denies residual snoring and skin irritation . Generally sleep quality is fair -- he thinks he benefits from the machine but needs the pressure adjusted to keep it from waking him up as much.     DME provider: [employer]  Device: [employer] 11 ASV  Settings: ASV EPAP 13, PS 3-12  Mask: has tried multiple, FFM in various sizes, nasal   Chin strap/lip strap: no    Sleep History  1/16/24 - PSG split  night, AHI 51.7, gamaliel saturation 76% - saturations <88% below for 26.9 minutes of TST of diagnostic portion.  CPAP was tried from 6 to 11. 66.3% of all events were classified as central apneas, gamaliel saturation 82% - saturations <88% below for 5.8 minutes of TS of treatment portion. Rec: titration study.   1/31/24 - PSG titration. Central respiratory events accounted for 54% of respiratory events. PLMS index of 18.4. BiPAP was tried from 9/5 to 13/9. ASV was tried from EPAP: 6, PS: 3/15 to EPAP: 13, PS: 3/12. Rec: ASV EPAP 13 pressure support 3-12 cmH2O    Patient Active Problem List    Diagnosis Date Noted    Oropharyngeal dysphagia 08/05/2024    Flexor tenosynovitis of finger 07/06/2024    Tremor 03/28/2024    Bipolar 2 disorder (HCC) 12/01/2023    Ischemic optic neuropathy of left eye 11/15/2023    Mild dementia without behavioral disturbance, psychotic  disturbance, mood disturbance, or anxiety ([facility]) 11/15/2023    Essential tremor 11/15/2023    Optic neuritis due to demyelinating disease of central nervous system ([facility]) 04/03/2023    Lumbar spondylosis 11/28/2022    Right foot pain 11/09/2022    Degenerative lumbar spinal stenosis 10/25/2022    NAFLD (nonalcoholic fatty liver disease) 07/12/2022    Type 2 diabetes mellitus with diabetic polyneuropathy, with long-term current use of insulin ([facility]) 07/12/2022    Atopic dermatitis 07/12/2022    Lumbar back pain with radiculopathy affecting left lower extremity 07/12/2022    Other specified inflammatory spondylopathies, site unspecified ([facility]) 07/22/2021    Personal history of other diseases of the nervous system and sense organs 03/12/2021    Lower urinary tract symptoms due to benign prostatic hyperplasia 10/06/2019    History of suicide attempt 06/10/2019    Pain of lumbar facet joint 03/12/2019    Incipient cataract of both eyes 03/08/2019    Ischemic optic neuropathy, right eye 03/08/2019    Type 2 diabetes mellitus without retinopathy ([facility]) 01/24/2019    Cerebrovascular accident (CVA) ([facility]) 08/05/2016    Hypersomnia due to medical condition 08/05/2016    Obstructive sleep apnea syndrome 08/05/2016    Dyslipidemia 05/31/2013    Memory loss 10/28/2012    High aspartate transaminase measurement 01/30/2010    Disorder of intervertebral disc 01/25/2010    GERD (gastroesophageal reflux disease) 08/19/2009    Generalized anxiety disorder 08/04/2009    HTN (hypertension) 08/04/2009    Major depressive disorder, recurrent episode, mild ([facility]) 08/04/2009    Osteoarthrosis 08/04/2009    Rosacea 08/04/2009       Past Medical History[1]     Past Surgical History[2]    Family History   Problem Relation Age of Onset    Cancer Mother         breast    Breast Cancer Mother     Cancer Father         esophageal and stomach    Stroke Father     Stroke Paternal Grandfather        Current Medications[3]     ALLERGIES: Azithromycin,  "Tetracycline, Erythromycin, Lisinopril, Ozempic (0.25 or 0.5 mg-dose) [semaglutide], and Metformin    ROS  Constitutional: Denies fevers, Denies weight changes  Ears/Nose/Throat/Mouth: Denies nasal congestion or sore throat   Cardiovascular: Denies chest pain  Respiratory: Denies shortness of breath, Denies cough  Gastrointestinal/Hepatic: Denies nausea, vomiting  Sleep: see HPI      PHYSICAL EXAM  /72 (BP Location: Left arm, Patient Position: Sitting, BP Cuff Size: Adult)   Pulse 78   Resp 16   Ht 1.676 m (5' 6\") Comment: per patient  Wt 73.3 kg (161 lb 11.2 oz)   SpO2 98%   BMI 26.10 kg/m²   Constitutional: Alert, no distress, well-groomed.  Skin: Warm, dry, good turgor, no rashes in visible areas.  Eye: Equal, round and reactive, conjunctiva clear, lids normal.  ENMT: Lips without lesions, good dentition, moist mucous membranes.  Neck: Trachea midline, no masses, no thyromegaly.  Respiratory: Unlabored respiratory effort, no cough.  MSK: Normal gait, moves all extremities.  Neuro: Grossly non-focal.   Psych: Alert and oriented x3, normal affect and mood.      Medical Decision Making   Assessment and Plan  The medical record was reviewed including Diagnostic and titration nocturnal polysomnogram's  and compliance reports .    Obstructive sleep apnea  - Compliance data reviewed showing 30% usage > 4hours in last 30 days. Average AHI 12.0 events/hour.  Leak 95th percentile 12.1. Pressure 95% 21.7/13  - Current Settings ASV EPAP 13, PS 3-12. Recommend decreasing pressure settings to EPAP 13, PS 3-9.   - Pt continues to use and benefit from machine.   - Orders placed for mask and supplies and updated pressure settings   - Advised to reach out via MyChart with questions     CHANTEL/PAP EDUCATION:  - Clean equipment frequently, and get new mask and supplies as allowed by insurance and DME.   - Avoid driving a motor vehicle when drowsy  - Patients with CHANTEL are at increased risk of cardiovascular disease including " coronary artery disease, systemic arterial hypertension, pulmonary arterial hypertension, cardiac arrythmias, and stroke. Positive airway pressure will favorably impact many of the adverse conditions and effects provoked by CHANTEL.       Return in about 1 year (around 7/22/2026), or if symptoms worsen or fail to improve.   - Recommend an earlier appointment, if significant treatment barriers develop.  - Patient to follow up with the appropriate healthcare practitioners for all other medical problems and issues.    Please note portions of this record was created using voice recognition software. I have made every reasonable attempt to correct obvious errors, but I expect that there are errors of grammar and possibly content I did not discover before finalizing the note.    Total time care for the patient this date was 25 minutes. Time spent includes chart review, lab review, discussion with myself. This time was spent separate from device analysis /programming on this date.             [1]   Past Medical History:  Diagnosis Date    Anxiety     Apnea, sleep     Arthritis     Chickenpox     Constipation     Daytime sleepiness     Depression     Diabetes (HCC)     Diarrhea     Difficulty swallowing     Dizziness     Eye drainage     Fatigue     Frequent urination     Frequent urination at night     GERD (gastroesophageal reflux disease)     Hearing difficulty     History of chickenpox     History of stroke     Hyperlipidemia     Hypertension     Influenza     Insomnia     Kidney stone     Mumps     Nasal drainage     Nausea     PONV (postoperative nausea and vomiting)     Ringing in ears     Sleep apnea     Snoring     Suicidal ideations     Sweat, sweating, excessive     Tonsillitis     Vision loss     Weakness     Wears glasses    [2]   Past Surgical History:  Procedure Laterality Date    FINGER OR HAND INCISION AND DRAINAGE Right 7/6/2024    Procedure: INCISION AND DRAINAGE, HAND;  Surgeon: Duane Cheek M.D.;   Location: SUR[facility];  Service: Orthopedics    SD NEUROPLASTY & OR TRANSPOS MEDIAN NRV CARPAL ESTUARDO Right 6/25/2024    Procedure: RIGHT WRIST CARPAL TUNNEL RELEASE,;  Surgeon: Jamil Guerrero M.D.;  Location: [facility];  Service: Orthopedics    PB INCISE FINGER TENDON SHEATH Right 6/25/2024    Procedure: RIGHT RING FINGER A-1 PULLEY RELEASE,;  Surgeon: Jamil Guerrero M.D.;  Location: [facility];  Service: Orthopedics    PB EXCIS TENDON SHEATH LESION, HAND/FINGER Right 6/25/2024    Procedure: RIGHT HAND MASS EXCISION;  Surgeon: Jamil Guerrero M.D.;  Location: [facility];  Service: Orthopedics    CHOLECYSTECTOMY      LIVER BIOPSY      SD REMV 2ND CATARACT,CORN-SCLER SECTN      VASECTOMY     [3]   Current Outpatient Medications   Medication Sig Dispense Refill    TRULICITY 0.75 MG/0.5ML Solution Auto-injector ADMINISTER 0.75 MG UNDER THE SKIN 1 TIME A WEEK      atorvastatin (LIPITOR) 40 MG Tab Take 1 Tablet by mouth every day. 90 Tablet 1    bisoprolol-hydrochlorothiazide (ZIAC) 2.5-6.25 MG per tablet Take 1 Tablet by mouth every day. 90 Tablet 3    Empagliflozin (JARDIANCE) 25 MG Tab Take 1 Tablet by mouth every day.      insulin lispro 100 UNIT/ML INJ Inject 1 Units under the skin 3 times a day before meals. Sliding scale      Insulin Glargine, 2 Unit Dial, (TOUJEO MAX SOLOSTAR) 300 UNIT/ML Solution Pen-injector Inject 28 Units under the skin every evening.      Probiotic Product (ALIGN) 4 MG Cap Take 1 Capsule by mouth every day.      betamethasone dipropionate 0.05 % Cream Apply thin layer topically to affected area twice daily. 45 g 1    tamsulosin (FLOMAX) 0.4 MG capsule Take 1 Capsule by mouth every day. 90 Capsule 3    omeprazole (PRILOSEC) 20 MG delayed-release capsule Take 1 Capsule by mouth every day. 90 Capsule 3    divalproex (DEPAKOTE) 250 MG Tablet Delayed Response Take 1 Tablet by mouth at bedtime.      DULoxetine HCl  40 MG Cap DR Particles       propranolol (INDERAL) 10 MG Tab Take 10 mg by mouth 3 times a day as needed.      ondansetron (ZOFRAN) 4 MG Tab tablet Take 4 mg by mouth.      GVOKE HYPOPEN 2-PACK 1 MG/0.2ML Solution Auto-injector inject 1 PEN NEEDLE subcutaneously if needed (FOR SEVERE HYPOGLYC...  (REFER TO PRESCRIPTION NOTES).      glucose blood (ONETOUCH VERIO) strip Check your blood sugar 2 to 3 times a day (Every morning before breakfast and every evening before dinner and sometimes before bed) 200 Strip 1    Insulin Pen Needle 32 G x 4 mm Use one insulin needle every day with insulin injection. 200 Each 1    Multiple Vitamins-Minerals (MULTIVITAMIN ADULTS PO) Take  by mouth.       No current facility-administered medications for this visit.

## 2025-07-23 ENCOUNTER — TELEPHONE (OUTPATIENT)
Dept: HEALTH INFORMATION MANAGEMENT | Facility: OTHER | Age: 70
End: 2025-07-23
Payer: MEDICARE

## 2025-08-05 ENCOUNTER — APPOINTMENT (OUTPATIENT)
Dept: NEUROLOGY | Facility: MEDICAL CENTER | Age: 70
End: 2025-08-05
Attending: INTERNAL MEDICINE
Payer: MEDICARE